# Patient Record
Sex: FEMALE | Race: WHITE | NOT HISPANIC OR LATINO | Employment: OTHER | URBAN - METROPOLITAN AREA
[De-identification: names, ages, dates, MRNs, and addresses within clinical notes are randomized per-mention and may not be internally consistent; named-entity substitution may affect disease eponyms.]

---

## 2018-07-02 DIAGNOSIS — I25.10 CORONARY ARTERY DISEASE, ANGINA PRESENCE UNSPECIFIED, UNSPECIFIED VESSEL OR LESION TYPE, UNSPECIFIED WHETHER NATIVE OR TRANSPLANTED HEART: Primary | ICD-10-CM

## 2018-07-03 ENCOUNTER — CLINICAL SUPPORT (OUTPATIENT)
Dept: CARDIOLOGY | Facility: CLINIC | Age: 68
End: 2018-07-03
Payer: MEDICARE

## 2018-07-03 ENCOUNTER — OFFICE VISIT (OUTPATIENT)
Dept: CARDIOLOGY | Facility: CLINIC | Age: 68
End: 2018-07-03
Payer: MEDICARE

## 2018-07-03 VITALS
HEART RATE: 67 BPM | HEIGHT: 60 IN | SYSTOLIC BLOOD PRESSURE: 134 MMHG | DIASTOLIC BLOOD PRESSURE: 82 MMHG | WEIGHT: 158.31 LBS | BODY MASS INDEX: 31.08 KG/M2

## 2018-07-03 DIAGNOSIS — I25.10 CORONARY ARTERY DISEASE, ANGINA PRESENCE UNSPECIFIED, UNSPECIFIED VESSEL OR LESION TYPE, UNSPECIFIED WHETHER NATIVE OR TRANSPLANTED HEART: ICD-10-CM

## 2018-07-03 DIAGNOSIS — E78.5 HYPERLIPIDEMIA, UNSPECIFIED HYPERLIPIDEMIA TYPE: ICD-10-CM

## 2018-07-03 DIAGNOSIS — I25.2 OLD MI (MYOCARDIAL INFARCTION): ICD-10-CM

## 2018-07-03 DIAGNOSIS — Z98.61 S/P PTCA (PERCUTANEOUS TRANSLUMINAL CORONARY ANGIOPLASTY): ICD-10-CM

## 2018-07-03 DIAGNOSIS — I25.10 CORONARY ARTERY DISEASE, ANGINA PRESENCE UNSPECIFIED, UNSPECIFIED VESSEL OR LESION TYPE, UNSPECIFIED WHETHER NATIVE OR TRANSPLANTED HEART: Primary | ICD-10-CM

## 2018-07-03 DIAGNOSIS — I10 HYPERTENSION, UNSPECIFIED TYPE: ICD-10-CM

## 2018-07-03 PROCEDURE — 3075F SYST BP GE 130 - 139MM HG: CPT | Mod: CPTII,S$GLB,, | Performed by: INTERNAL MEDICINE

## 2018-07-03 PROCEDURE — 99214 OFFICE O/P EST MOD 30 MIN: CPT | Mod: S$GLB,,, | Performed by: INTERNAL MEDICINE

## 2018-07-03 PROCEDURE — 99999 PR PBB SHADOW E&M-EST. PATIENT-LVL III: CPT | Mod: PBBFAC,,, | Performed by: INTERNAL MEDICINE

## 2018-07-03 PROCEDURE — 3079F DIAST BP 80-89 MM HG: CPT | Mod: CPTII,S$GLB,, | Performed by: INTERNAL MEDICINE

## 2018-07-03 PROCEDURE — 93000 ELECTROCARDIOGRAM COMPLETE: CPT | Mod: S$GLB,,, | Performed by: INTERNAL MEDICINE

## 2018-07-03 RX ORDER — PANTOPRAZOLE SODIUM 40 MG/1
40 TABLET, DELAYED RELEASE ORAL 2 TIMES DAILY
COMMUNITY

## 2018-07-03 NOTE — PROGRESS NOTES
Subjective:   Patient ID:  Dottie Ramos is a 67 y.o. female who presents for evaluation of Follow-up      HPI  A 66 yo female with h/o mi complex coronary intervention htn hlp is here for evaluation she has not been in clinic since 2/2015 more than 3 years ago. She has  Had foot surgery twice on the left and once on the rt she is not exercising she has dental issues her eating habits are not on target. She has no chest pain or shortness of breath. She has no issues clinically with chf palpitation tia claudication. She is not smoking.take meds regularily. Has no bleeding . Her lbas were done at her pcp her  ekg is normal.  Past Medical History:   Diagnosis Date    Coronary artery disease     Hyperlipidemia     Hypertension     Old MI (myocardial infarction)     S/P PTCA (percutaneous transluminal coronary angioplasty)        Past Surgical History:   Procedure Laterality Date    CORONARY ANGIOPLASTY         Social History   Substance Use Topics    Smoking status: Former Smoker     Packs/day: 1.00     Years: 30.00     Quit date: 1/8/2004    Smokeless tobacco: Never Used    Alcohol use No       Family History   Problem Relation Age of Onset    Heart attack Father     Heart attack Brother     No Known Problems Mother     No Known Problems Sister     No Known Problems Maternal Aunt     No Known Problems Maternal Uncle     No Known Problems Paternal Aunt     No Known Problems Paternal Uncle     No Known Problems Maternal Grandmother     No Known Problems Maternal Grandfather     No Known Problems Paternal Grandmother     No Known Problems Paternal Grandfather     Asthma Neg Hx     Cancer Neg Hx     Diabetes Neg Hx     Emphysema Neg Hx     Heart failure Neg Hx     Hypertension Neg Hx     Asbestos Neg Hx        Current Outpatient Prescriptions   Medication Sig    aspirin (ECOTRIN) 81 MG EC tablet Take 81 mg by mouth once daily.    citalopram (CELEXA) 20 MG tablet Take 20 mg by mouth once  daily.    clopidogrel (PLAVIX) 75 mg tablet Take 75 mg by mouth once daily.    losartan (COZAAR) 50 MG tablet Take 50 mg by mouth once daily.    metoprolol tartrate (LOPRESSOR) 25 MG tablet Take 25 mg by mouth 2 (two) times daily.    pantoprazole (PROTONIX) 40 MG tablet Take 40 mg by mouth 2 (two) times daily.    simvastatin (ZOCOR) 80 MG tablet Take 80 mg by mouth every evening.    zolpidem (AMBIEN) 5 MG Tab Take 5 mg by mouth nightly as needed.    nitroGLYCERIN (NITROSTAT) 0.4 MG SL tablet Place 1 tablet (0.4 mg total) under the tongue every 5 (five) minutes as needed for Chest pain.     No current facility-administered medications for this visit.      Current Outpatient Prescriptions on File Prior to Visit   Medication Sig    aspirin (ECOTRIN) 81 MG EC tablet Take 81 mg by mouth once daily.    citalopram (CELEXA) 20 MG tablet Take 20 mg by mouth once daily.    clopidogrel (PLAVIX) 75 mg tablet Take 75 mg by mouth once daily.    losartan (COZAAR) 50 MG tablet Take 50 mg by mouth once daily.    metoprolol tartrate (LOPRESSOR) 25 MG tablet Take 25 mg by mouth 2 (two) times daily.    simvastatin (ZOCOR) 80 MG tablet Take 80 mg by mouth every evening.    zolpidem (AMBIEN) 5 MG Tab Take 5 mg by mouth nightly as needed.    nitroGLYCERIN (NITROSTAT) 0.4 MG SL tablet Place 1 tablet (0.4 mg total) under the tongue every 5 (five) minutes as needed for Chest pain.    [DISCONTINUED] dicyclomine (BENTYL) 10 MG capsule Take 10 mg by mouth once daily.     No current facility-administered medications on file prior to visit.        Review of patient's allergies indicates:   Allergen Reactions    Macrodantin [nitrofurantoin macrocrystalline] Swelling     Pt reports occurrence of tongue swelling    Bactrim [sulfamethoxazole-trimethoprim] Rash    Pcn [penicillins] Rash     Review of patient's allergies indicates:   Allergen Reactions    Macrodantin [nitrofurantoin macrocrystalline] Swelling     Pt reports  occurrence of tongue swelling    Bactrim [sulfamethoxazole-trimethoprim] Rash    Pcn [penicillins] Rash       Review of Systems   Constitution: Negative for diaphoresis, weakness, malaise/fatigue and weight gain.   HENT: Negative for hoarse voice.    Eyes: Negative for double vision and visual disturbance.   Cardiovascular: Positive for leg swelling. Negative for chest pain, claudication, cyanosis, dyspnea on exertion, irregular heartbeat, near-syncope, orthopnea, palpitations, paroxysmal nocturnal dyspnea and syncope.   Respiratory: Negative for cough, hemoptysis, shortness of breath and snoring.    Hematologic/Lymphatic: Negative for bleeding problem. Does not bruise/bleed easily.   Skin: Negative for color change and poor wound healing.   Musculoskeletal: Positive for arthritis and joint pain. Negative for muscle cramps, muscle weakness and myalgias.   Gastrointestinal: Positive for heartburn. Negative for bloating, abdominal pain, change in bowel habit, diarrhea, hematemesis, hematochezia, melena and nausea.   Neurological: Negative for excessive daytime sleepiness, dizziness, headaches, light-headedness, loss of balance and numbness.   Psychiatric/Behavioral: Negative for memory loss. The patient does not have insomnia.    Allergic/Immunologic: Negative for hives.       Objective:   Physical Exam   Constitutional: She is oriented to person, place, and time. She appears well-developed and well-nourished. She does not appear ill. No distress.   HENT:   Head: Normocephalic and atraumatic.   Eyes: EOM are normal. Pupils are equal, round, and reactive to light. No scleral icterus.   Neck: Normal range of motion. Neck supple. Normal carotid pulses, no hepatojugular reflux and no JVD present. Carotid bruit is not present. No tracheal deviation present. No thyromegaly present.   Cardiovascular: Normal rate, regular rhythm, normal heart sounds and normal pulses.  Exam reveals no gallop and no friction rub.    No  murmur heard.  Pulmonary/Chest: Effort normal and breath sounds normal. No respiratory distress. She has no wheezes. She has no rhonchi. She has no rales. She exhibits no tenderness.   Abdominal: Soft. Normal appearance, normal aorta and bowel sounds are normal. She exhibits no abdominal bruit, no ascites and no pulsatile midline mass. There is no hepatomegaly. There is no tenderness.   Musculoskeletal: She exhibits no edema.        Right shoulder: She exhibits no deformity.   Neurological: She is alert and oriented to person, place, and time. She has normal strength. No cranial nerve deficit. Coordination normal.   Skin: Skin is warm and dry. No rash noted. She is not diaphoretic. No cyanosis or erythema. Nails show no clubbing.   Psychiatric: She has a normal mood and affect. Her speech is normal and behavior is normal.   Nursing note and vitals reviewed.    Vitals:    07/03/18 0905   BP: 134/82   BP Location: Right arm   Patient Position: Sitting   Pulse: 67   Weight: 71.8 kg (158 lb 4.6 oz)   Height: 5' (1.524 m)     Lab Results   Component Value Date    CHOL 172 03/02/2015    CHOL 170 01/08/2014    CHOL 148 07/08/2011     Lab Results   Component Value Date    HDL 51 03/02/2015    HDL 49 01/08/2014    HDL 44 07/08/2011     Lab Results   Component Value Date    LDLCALC 103.0 03/02/2015    LDLCALC 97.4 01/08/2014    LDLCALC 85.8 07/08/2011     Lab Results   Component Value Date    TRIG 90 03/02/2015    TRIG 118 01/08/2014    TRIG 91 07/08/2011     Lab Results   Component Value Date    CHOLHDL 29.7 03/02/2015    CHOLHDL 28.8 01/08/2014    CHOLHDL 29.7 07/08/2011       Chemistry        Component Value Date/Time     03/02/2015 0926    K 5.0 03/02/2015 0926     03/02/2015 0926    CO2 28 03/02/2015 0926    BUN 17 03/02/2015 0926    CREATININE 0.8 01/29/2016 1229    GLU 98 03/02/2015 0926        Component Value Date/Time    CALCIUM 10.2 03/02/2015 0926    ALKPHOS 58 03/02/2015 0926    AST 27 03/02/2015 0926     ALT 29 03/02/2015 0926    BILITOT 0.5 03/02/2015 0926    ESTGFRAFRICA >60.0 01/29/2016 1229    EGFRNONAA >60.0 01/29/2016 1229          Lab Results   Component Value Date    TSH 0.944 03/02/2015     Lab Results   Component Value Date    INR 1.0 01/12/2005     Lab Results   Component Value Date    WBC 4.89 03/02/2015    HGB 14.3 03/02/2015    HCT 41.7 03/02/2015    MCV 92 03/02/2015     03/02/2015     BNP  @LABRCNTIP(BNP,BNPTRIAGEBLO)@  CrCl cannot be calculated (Patient's most recent lab result is older than the maximum 7 days allowed.).  Assessment:     1. Coronary artery disease, angina presence unspecified, unspecified vessel or lesion type, unspecified whether native or transplanted heart    2. Hyperlipidemia, unspecified hyperlipidemia type    3. Hypertension, unspecified type    4. Old MI (myocardial infarction)    5. S/P PTCA (percutaneous transluminal coronary angioplasty)      Stable clinically asymptomatic ekg normal. She needs to be more active exercise and weight loss and be more compliant .will get lab work from her pcp office   Plan:   Continue current therapy  Cardiac low salt diet.  Risk factor modification and excercise program./weight loss   F/u in 6 months with lipid cmp ekg.

## 2019-01-07 ENCOUNTER — LAB VISIT (OUTPATIENT)
Dept: LAB | Facility: HOSPITAL | Age: 69
End: 2019-01-07
Attending: INTERNAL MEDICINE
Payer: MEDICARE

## 2019-01-07 DIAGNOSIS — E78.5 HYPERLIPIDEMIA, UNSPECIFIED HYPERLIPIDEMIA TYPE: ICD-10-CM

## 2019-01-07 DIAGNOSIS — I25.10 CORONARY ARTERY DISEASE, ANGINA PRESENCE UNSPECIFIED, UNSPECIFIED VESSEL OR LESION TYPE, UNSPECIFIED WHETHER NATIVE OR TRANSPLANTED HEART: ICD-10-CM

## 2019-01-07 LAB
CHOLEST SERPL-MCNC: 149 MG/DL
CHOLEST/HDLC SERPL: 3 {RATIO}
HDLC SERPL-MCNC: 49 MG/DL
HDLC SERPL: 32.9 %
LDLC SERPL CALC-MCNC: 78.8 MG/DL
NONHDLC SERPL-MCNC: 100 MG/DL
TRIGL SERPL-MCNC: 106 MG/DL

## 2019-01-07 PROCEDURE — 80061 LIPID PANEL: CPT

## 2019-01-07 PROCEDURE — 36415 COLL VENOUS BLD VENIPUNCTURE: CPT | Mod: PO

## 2019-01-15 DIAGNOSIS — I25.10 CORONARY ARTERY DISEASE, ANGINA PRESENCE UNSPECIFIED, UNSPECIFIED VESSEL OR LESION TYPE, UNSPECIFIED WHETHER NATIVE OR TRANSPLANTED HEART: Primary | ICD-10-CM

## 2019-01-16 ENCOUNTER — OFFICE VISIT (OUTPATIENT)
Dept: CARDIOLOGY | Facility: CLINIC | Age: 69
End: 2019-01-16
Payer: MEDICARE

## 2019-01-16 ENCOUNTER — CLINICAL SUPPORT (OUTPATIENT)
Dept: CARDIOLOGY | Facility: CLINIC | Age: 69
End: 2019-01-16
Payer: MEDICARE

## 2019-01-16 VITALS
BODY MASS INDEX: 31.41 KG/M2 | WEIGHT: 160 LBS | SYSTOLIC BLOOD PRESSURE: 138 MMHG | DIASTOLIC BLOOD PRESSURE: 76 MMHG | HEART RATE: 61 BPM | HEIGHT: 60 IN

## 2019-01-16 DIAGNOSIS — E78.5 HYPERLIPIDEMIA, UNSPECIFIED HYPERLIPIDEMIA TYPE: ICD-10-CM

## 2019-01-16 DIAGNOSIS — I25.10 CORONARY ARTERY DISEASE, ANGINA PRESENCE UNSPECIFIED, UNSPECIFIED VESSEL OR LESION TYPE, UNSPECIFIED WHETHER NATIVE OR TRANSPLANTED HEART: ICD-10-CM

## 2019-01-16 DIAGNOSIS — I25.10 CORONARY ARTERY DISEASE, ANGINA PRESENCE UNSPECIFIED, UNSPECIFIED VESSEL OR LESION TYPE, UNSPECIFIED WHETHER NATIVE OR TRANSPLANTED HEART: Primary | ICD-10-CM

## 2019-01-16 DIAGNOSIS — I25.10 CORONARY ARTERY DISEASE: ICD-10-CM

## 2019-01-16 DIAGNOSIS — Z98.61 S/P PTCA (PERCUTANEOUS TRANSLUMINAL CORONARY ANGIOPLASTY): ICD-10-CM

## 2019-01-16 DIAGNOSIS — I10 HYPERTENSION, UNSPECIFIED TYPE: ICD-10-CM

## 2019-01-16 DIAGNOSIS — I25.2 OLD MI (MYOCARDIAL INFARCTION): ICD-10-CM

## 2019-01-16 PROCEDURE — 3075F SYST BP GE 130 - 139MM HG: CPT | Mod: CPTII,S$GLB,, | Performed by: INTERNAL MEDICINE

## 2019-01-16 PROCEDURE — 99999 PR PBB SHADOW E&M-EST. PATIENT-LVL III: CPT | Mod: PBBFAC,,, | Performed by: INTERNAL MEDICINE

## 2019-01-16 PROCEDURE — 99214 OFFICE O/P EST MOD 30 MIN: CPT | Mod: S$GLB,,, | Performed by: INTERNAL MEDICINE

## 2019-01-16 PROCEDURE — 3078F DIAST BP <80 MM HG: CPT | Mod: CPTII,S$GLB,, | Performed by: INTERNAL MEDICINE

## 2019-01-16 PROCEDURE — 93000 ELECTROCARDIOGRAM COMPLETE: CPT | Mod: S$GLB,,, | Performed by: INTERNAL MEDICINE

## 2019-01-16 PROCEDURE — 3078F PR MOST RECENT DIASTOLIC BLOOD PRESSURE < 80 MM HG: ICD-10-PCS | Mod: CPTII,S$GLB,, | Performed by: INTERNAL MEDICINE

## 2019-01-16 PROCEDURE — 99999 PR PBB SHADOW E&M-EST. PATIENT-LVL III: ICD-10-PCS | Mod: PBBFAC,,, | Performed by: INTERNAL MEDICINE

## 2019-01-16 PROCEDURE — 3075F PR MOST RECENT SYSTOLIC BLOOD PRESS GE 130-139MM HG: ICD-10-PCS | Mod: CPTII,S$GLB,, | Performed by: INTERNAL MEDICINE

## 2019-01-16 PROCEDURE — 93000 EKG 12-LEAD: ICD-10-PCS | Mod: S$GLB,,, | Performed by: INTERNAL MEDICINE

## 2019-01-16 PROCEDURE — 99214 PR OFFICE/OUTPT VISIT, EST, LEVL IV, 30-39 MIN: ICD-10-PCS | Mod: S$GLB,,, | Performed by: INTERNAL MEDICINE

## 2019-01-16 RX ORDER — NITROGLYCERIN 0.4 MG/1
0.4 TABLET SUBLINGUAL EVERY 5 MIN PRN
Qty: 60 TABLET | Refills: 12 | Status: SHIPPED | OUTPATIENT
Start: 2019-01-16 | End: 2023-04-12

## 2019-01-16 RX ORDER — DICYCLOMINE HYDROCHLORIDE 10 MG/1
10 CAPSULE ORAL 3 TIMES DAILY
COMMUNITY

## 2019-01-16 NOTE — PROGRESS NOTES
Subjective:   Patient ID:  Dottie Ramos is a 68 y.o. female who presents for follow up of Coronary Artery Disease; Hyperlipidemia; and Hypertension      HPI  A 67 yo female with cad htn hlp obesity is here for f/u she ahs beeen doing well clinically had exercise bike not using much yet needs to do betetr has no syncope near syncope  Has no chest pain or shortness of breath had gallbladder surgery in October w/o complications.had dental issues has dentures do not fit well. She is not been able to be compliant with diet she uses soft food lots of carbohydrates.  Lipids despite that are  on target.ekg unchanged,  Past Medical History:   Diagnosis Date    Coronary artery disease     Hyperlipidemia     Hypertension     Old MI (myocardial infarction)     S/P PTCA (percutaneous transluminal coronary angioplasty)        Past Surgical History:   Procedure Laterality Date    CORONARY ANGIOPLASTY         Social History     Tobacco Use    Smoking status: Former Smoker     Packs/day: 1.00     Years: 30.00     Pack years: 30.00     Last attempt to quit: 1/8/2004     Years since quitting: 15.0    Smokeless tobacco: Never Used   Substance Use Topics    Alcohol use: No    Drug use: No       Family History   Problem Relation Age of Onset    Heart attack Father     Heart attack Brother     No Known Problems Mother     No Known Problems Sister     No Known Problems Maternal Aunt     No Known Problems Maternal Uncle     No Known Problems Paternal Aunt     No Known Problems Paternal Uncle     No Known Problems Maternal Grandmother     No Known Problems Maternal Grandfather     No Known Problems Paternal Grandmother     No Known Problems Paternal Grandfather     Asthma Neg Hx     Cancer Neg Hx     Diabetes Neg Hx     Emphysema Neg Hx     Heart failure Neg Hx     Hypertension Neg Hx     Asbestos Neg Hx        Current Outpatient Medications   Medication Sig    aspirin (ECOTRIN) 81 MG EC tablet Take 81 mg by  mouth once daily.    citalopram (CELEXA) 20 MG tablet Take 20 mg by mouth once daily.    clopidogrel (PLAVIX) 75 mg tablet Take 75 mg by mouth once daily.    dicyclomine (BENTYL) 10 MG capsule Take 10 mg by mouth 3 (three) times daily.    losartan (COZAAR) 50 MG tablet Take 50 mg by mouth once daily.    metoprolol tartrate (LOPRESSOR) 25 MG tablet Take 25 mg by mouth 2 (two) times daily.    pantoprazole (PROTONIX) 40 MG tablet Take 40 mg by mouth 2 (two) times daily.    simvastatin (ZOCOR) 80 MG tablet Take 80 mg by mouth every evening.    zolpidem (AMBIEN) 5 MG Tab Take 5 mg by mouth nightly as needed.    nitroGLYCERIN (NITROSTAT) 0.4 MG SL tablet Place 1 tablet (0.4 mg total) under the tongue every 5 (five) minutes as needed for Chest pain.     No current facility-administered medications for this visit.      Current Outpatient Medications on File Prior to Visit   Medication Sig    aspirin (ECOTRIN) 81 MG EC tablet Take 81 mg by mouth once daily.    citalopram (CELEXA) 20 MG tablet Take 20 mg by mouth once daily.    clopidogrel (PLAVIX) 75 mg tablet Take 75 mg by mouth once daily.    dicyclomine (BENTYL) 10 MG capsule Take 10 mg by mouth 3 (three) times daily.    losartan (COZAAR) 50 MG tablet Take 50 mg by mouth once daily.    metoprolol tartrate (LOPRESSOR) 25 MG tablet Take 25 mg by mouth 2 (two) times daily.    pantoprazole (PROTONIX) 40 MG tablet Take 40 mg by mouth 2 (two) times daily.    simvastatin (ZOCOR) 80 MG tablet Take 80 mg by mouth every evening.    zolpidem (AMBIEN) 5 MG Tab Take 5 mg by mouth nightly as needed.    nitroGLYCERIN (NITROSTAT) 0.4 MG SL tablet Place 1 tablet (0.4 mg total) under the tongue every 5 (five) minutes as needed for Chest pain.     No current facility-administered medications on file prior to visit.      Review of patient's allergies indicates:   Allergen Reactions    Macrodantin [nitrofurantoin macrocrystalline] Swelling     Pt reports occurrence of  tongue swelling    Bactrim [sulfamethoxazole-trimethoprim] Rash    Pcn [penicillins] Rash       Review of Systems   Constitution: Positive for weight gain. Negative for diaphoresis, weakness, malaise/fatigue and weight loss.   HENT: Negative for hoarse voice.    Eyes: Negative for double vision and visual disturbance.   Cardiovascular: Negative for chest pain, claudication, cyanosis, dyspnea on exertion, irregular heartbeat, leg swelling, near-syncope, orthopnea, palpitations, paroxysmal nocturnal dyspnea and syncope.   Respiratory: Negative for cough, hemoptysis, shortness of breath and snoring.    Hematologic/Lymphatic: Negative for bleeding problem. Does not bruise/bleed easily.   Skin: Negative for color change and poor wound healing.   Musculoskeletal: Negative for muscle cramps, muscle weakness and myalgias.   Gastrointestinal: Negative for bloating, abdominal pain, change in bowel habit, diarrhea, heartburn, hematemesis, hematochezia, melena and nausea.   Neurological: Negative for excessive daytime sleepiness, dizziness, headaches, light-headedness, loss of balance and numbness.   Psychiatric/Behavioral: Negative for memory loss. The patient does not have insomnia.    Allergic/Immunologic: Negative for hives.       Objective:   Physical Exam   Constitutional: She is oriented to person, place, and time. She appears well-developed and well-nourished. She does not appear ill. No distress.   HENT:   Head: Normocephalic and atraumatic.   Eyes: EOM are normal. Pupils are equal, round, and reactive to light. No scleral icterus.   Neck: Normal range of motion. Neck supple. Normal carotid pulses, no hepatojugular reflux and no JVD present. Carotid bruit is not present. No tracheal deviation present. No thyromegaly present.   Cardiovascular: Normal rate, regular rhythm, normal heart sounds and normal pulses. Exam reveals no gallop and no friction rub.   No murmur heard.  Pulmonary/Chest: Effort normal and breath  sounds normal. No respiratory distress. She has no wheezes. She has no rhonchi. She has no rales. She exhibits no tenderness.   Abdominal: Soft. Normal appearance, normal aorta and bowel sounds are normal. She exhibits no abdominal bruit, no ascites and no pulsatile midline mass. There is no hepatomegaly. There is no tenderness.   Musculoskeletal: She exhibits no edema.        Right shoulder: She exhibits no deformity.   Neurological: She is alert and oriented to person, place, and time. She has normal strength. No cranial nerve deficit. Coordination normal.   Skin: Skin is warm and dry. No rash noted. She is not diaphoretic. No cyanosis or erythema. Nails show no clubbing.   Psychiatric: She has a normal mood and affect. Her speech is normal and behavior is normal.   Nursing note and vitals reviewed.    Vitals:    01/16/19 1012   BP: 138/76   BP Location: Right arm   Patient Position: Sitting   BP Method: Medium (Manual)   Pulse: 61   Weight: 72.6 kg (160 lb)   Height: 5' (1.524 m)     Lab Results   Component Value Date    CHOL 149 01/07/2019    CHOL 172 03/02/2015    CHOL 170 01/08/2014     Lab Results   Component Value Date    HDL 49 01/07/2019    HDL 51 03/02/2015    HDL 49 01/08/2014     Lab Results   Component Value Date    LDLCALC 78.8 01/07/2019    LDLCALC 103.0 03/02/2015    LDLCALC 97.4 01/08/2014     Lab Results   Component Value Date    TRIG 106 01/07/2019    TRIG 90 03/02/2015    TRIG 118 01/08/2014     Lab Results   Component Value Date    CHOLHDL 32.9 01/07/2019    CHOLHDL 29.7 03/02/2015    CHOLHDL 28.8 01/08/2014       Chemistry        Component Value Date/Time     03/02/2015 0926    K 5.0 03/02/2015 0926     03/02/2015 0926    CO2 28 03/02/2015 0926    BUN 17 03/02/2015 0926    CREATININE 0.8 01/29/2016 1229    GLU 98 03/02/2015 0926        Component Value Date/Time    CALCIUM 10.2 03/02/2015 0926    ALKPHOS 58 03/02/2015 0926    AST 27 03/02/2015 0926    ALT 29 03/02/2015 0926     BILITOT 0.5 03/02/2015 0926    ESTGFRAFRICA >60.0 01/29/2016 1229    EGFRNONAA >60.0 01/29/2016 1229          Lab Results   Component Value Date    TSH 0.944 03/02/2015     Lab Results   Component Value Date    INR 1.0 01/12/2005     Lab Results   Component Value Date    WBC 4.89 03/02/2015    HGB 14.3 03/02/2015    HCT 41.7 03/02/2015    MCV 92 03/02/2015     03/02/2015     BMP  Sodium   Date Value Ref Range Status   03/02/2015 142 136 - 145 mmol/L Final     Potassium   Date Value Ref Range Status   03/02/2015 5.0 3.5 - 5.1 mmol/L Final     Chloride   Date Value Ref Range Status   03/02/2015 106 95 - 110 mmol/L Final     CO2   Date Value Ref Range Status   03/02/2015 28 23 - 29 mmol/L Final     BUN, Bld   Date Value Ref Range Status   03/02/2015 17 8 - 23 mg/dL Final     Creatinine   Date Value Ref Range Status   01/29/2016 0.8 0.5 - 1.4 mg/dL Final     Calcium   Date Value Ref Range Status   03/02/2015 10.2 8.7 - 10.5 mg/dL Final     Anion Gap   Date Value Ref Range Status   03/02/2015 8 8 - 16 mmol/L Final     eGFR if    Date Value Ref Range Status   01/29/2016 >60.0 >60 mL/min/1.73 m^2 Final     eGFR if non    Date Value Ref Range Status   01/29/2016 >60.0 >60 mL/min/1.73 m^2 Final     Comment:     Calculation used to obtain the estimated glomerular filtration  rate (eGFR) is the CKD-EPI equation. Since race is unknown   in our information system, the eGFR values for   -American and Non--American patients are given   for each creatinine result.       CrCl cannot be calculated (Patient's most recent lab result is older than the maximum 7 days allowed.).    Assessment:     1. Coronary artery disease, angina presence unspecified, unspecified vessel or lesion type, unspecified whether native or transplanted heart    2. Hyperlipidemia, unspecified hyperlipidemia type    3. Hypertension, unspecified type    4. Old MI (myocardial infarction)    5. S/P PTCA  (percutaneous transluminal coronary angioplasty)      asymptomatic clinically needs better exercise weight loss and compliance she was counseled.   Plan:   Continue current therapy  Cardiac low salt diet.  Risk factor modification and excercise program.  F/u in 6 months with lipid cmp .

## 2019-07-31 ENCOUNTER — LAB VISIT (OUTPATIENT)
Dept: LAB | Facility: HOSPITAL | Age: 69
End: 2019-07-31
Attending: INTERNAL MEDICINE
Payer: MEDICARE

## 2019-07-31 DIAGNOSIS — E78.5 HYPERLIPIDEMIA, UNSPECIFIED HYPERLIPIDEMIA TYPE: ICD-10-CM

## 2019-07-31 DIAGNOSIS — I25.10 CORONARY ARTERY DISEASE, ANGINA PRESENCE UNSPECIFIED, UNSPECIFIED VESSEL OR LESION TYPE, UNSPECIFIED WHETHER NATIVE OR TRANSPLANTED HEART: ICD-10-CM

## 2019-07-31 LAB
ALBUMIN SERPL BCP-MCNC: 4 G/DL (ref 3.5–5.2)
ALP SERPL-CCNC: 60 U/L (ref 55–135)
ALT SERPL W/O P-5'-P-CCNC: 19 U/L (ref 10–44)
ANION GAP SERPL CALC-SCNC: 7 MMOL/L (ref 8–16)
AST SERPL-CCNC: 24 U/L (ref 10–40)
BILIRUB SERPL-MCNC: 0.6 MG/DL (ref 0.1–1)
BUN SERPL-MCNC: 19 MG/DL (ref 8–23)
CALCIUM SERPL-MCNC: 10.3 MG/DL (ref 8.7–10.5)
CHLORIDE SERPL-SCNC: 102 MMOL/L (ref 95–110)
CHOLEST SERPL-MCNC: 152 MG/DL (ref 120–199)
CHOLEST/HDLC SERPL: 2.6 {RATIO} (ref 2–5)
CO2 SERPL-SCNC: 30 MMOL/L (ref 23–29)
CREAT SERPL-MCNC: 0.8 MG/DL (ref 0.5–1.4)
EST. GFR  (AFRICAN AMERICAN): >60 ML/MIN/1.73 M^2
EST. GFR  (NON AFRICAN AMERICAN): >60 ML/MIN/1.73 M^2
GLUCOSE SERPL-MCNC: 92 MG/DL (ref 70–110)
HDLC SERPL-MCNC: 58 MG/DL (ref 40–75)
HDLC SERPL: 38.2 % (ref 20–50)
LDLC SERPL CALC-MCNC: 79.2 MG/DL (ref 63–159)
NONHDLC SERPL-MCNC: 94 MG/DL
POTASSIUM SERPL-SCNC: 3.9 MMOL/L (ref 3.5–5.1)
PROT SERPL-MCNC: 6.6 G/DL (ref 6–8.4)
SODIUM SERPL-SCNC: 139 MMOL/L (ref 136–145)
TRIGL SERPL-MCNC: 74 MG/DL (ref 30–150)

## 2019-07-31 PROCEDURE — 80053 COMPREHEN METABOLIC PANEL: CPT

## 2019-07-31 PROCEDURE — 80061 LIPID PANEL: CPT

## 2019-07-31 PROCEDURE — 36415 COLL VENOUS BLD VENIPUNCTURE: CPT

## 2019-08-14 ENCOUNTER — OFFICE VISIT (OUTPATIENT)
Dept: CARDIOLOGY | Facility: CLINIC | Age: 69
End: 2019-08-14
Payer: MEDICARE

## 2019-08-14 VITALS
SYSTOLIC BLOOD PRESSURE: 146 MMHG | WEIGHT: 157.88 LBS | HEART RATE: 58 BPM | BODY MASS INDEX: 30.99 KG/M2 | HEIGHT: 60 IN | DIASTOLIC BLOOD PRESSURE: 93 MMHG

## 2019-08-14 DIAGNOSIS — I10 HYPERTENSION, UNSPECIFIED TYPE: ICD-10-CM

## 2019-08-14 DIAGNOSIS — I25.10 ATHEROSCLEROSIS OF NATIVE CORONARY ARTERY OF NATIVE HEART WITHOUT ANGINA PECTORIS: ICD-10-CM

## 2019-08-14 DIAGNOSIS — I25.10 CORONARY ARTERY DISEASE, ANGINA PRESENCE UNSPECIFIED, UNSPECIFIED VESSEL OR LESION TYPE, UNSPECIFIED WHETHER NATIVE OR TRANSPLANTED HEART: Primary | ICD-10-CM

## 2019-08-14 DIAGNOSIS — I25.2 OLD MI (MYOCARDIAL INFARCTION): ICD-10-CM

## 2019-08-14 DIAGNOSIS — Z98.61 S/P PTCA (PERCUTANEOUS TRANSLUMINAL CORONARY ANGIOPLASTY): ICD-10-CM

## 2019-08-14 DIAGNOSIS — E78.5 HYPERLIPIDEMIA, UNSPECIFIED HYPERLIPIDEMIA TYPE: ICD-10-CM

## 2019-08-14 PROCEDURE — 3074F PR MOST RECENT SYSTOLIC BLOOD PRESSURE < 130 MM HG: ICD-10-PCS | Mod: CPTII,S$GLB,, | Performed by: INTERNAL MEDICINE

## 2019-08-14 PROCEDURE — 3078F DIAST BP <80 MM HG: CPT | Mod: CPTII,S$GLB,, | Performed by: INTERNAL MEDICINE

## 2019-08-14 PROCEDURE — 3078F PR MOST RECENT DIASTOLIC BLOOD PRESSURE < 80 MM HG: ICD-10-PCS | Mod: CPTII,S$GLB,, | Performed by: INTERNAL MEDICINE

## 2019-08-14 PROCEDURE — 3074F SYST BP LT 130 MM HG: CPT | Mod: CPTII,S$GLB,, | Performed by: INTERNAL MEDICINE

## 2019-08-14 PROCEDURE — 99214 PR OFFICE/OUTPT VISIT, EST, LEVL IV, 30-39 MIN: ICD-10-PCS | Mod: S$GLB,,, | Performed by: INTERNAL MEDICINE

## 2019-08-14 PROCEDURE — 99999 PR PBB SHADOW E&M-EST. PATIENT-LVL III: ICD-10-PCS | Mod: PBBFAC,,, | Performed by: INTERNAL MEDICINE

## 2019-08-14 PROCEDURE — 99999 PR PBB SHADOW E&M-EST. PATIENT-LVL III: CPT | Mod: PBBFAC,,, | Performed by: INTERNAL MEDICINE

## 2019-08-14 PROCEDURE — 99214 OFFICE O/P EST MOD 30 MIN: CPT | Mod: S$GLB,,, | Performed by: INTERNAL MEDICINE

## 2019-08-14 RX ORDER — CHOLECALCIFEROL (VITAMIN D3) 25 MCG
2000 TABLET ORAL DAILY
COMMUNITY

## 2019-08-14 NOTE — PROGRESS NOTES
Subjective:   Patient ID:  Dottie Ramos is a 68 y.o. female who presents for follow up of Coronary Artery Disease; Hypertension; and Hyperlipidemia      HPI  A 67 yo female with cad s/p bifurcation lad diagonal stent htn hlp old mi is here for f /u cad./ she ahs been exercising but not being compliant with diet. Has no chest pain or shortness of breath. Has no other issues clinically. Lipids are marginal. Weight seems to fluctuates.  Past Medical History:   Diagnosis Date    Coronary artery disease     Hyperlipidemia     Hypertension     Old MI (myocardial infarction)     S/P PTCA (percutaneous transluminal coronary angioplasty)        Past Surgical History:   Procedure Laterality Date    CORONARY ANGIOPLASTY         Social History     Tobacco Use    Smoking status: Former Smoker     Packs/day: 1.00     Years: 30.00     Pack years: 30.00     Last attempt to quit: 1/8/2004     Years since quitting: 15.6    Smokeless tobacco: Never Used   Substance Use Topics    Alcohol use: No    Drug use: No       Family History   Problem Relation Age of Onset    Heart attack Father     Heart attack Brother     No Known Problems Mother     No Known Problems Sister     No Known Problems Maternal Aunt     No Known Problems Maternal Uncle     No Known Problems Paternal Aunt     No Known Problems Paternal Uncle     No Known Problems Maternal Grandmother     No Known Problems Maternal Grandfather     No Known Problems Paternal Grandmother     No Known Problems Paternal Grandfather     Asthma Neg Hx     Cancer Neg Hx     Diabetes Neg Hx     Emphysema Neg Hx     Heart failure Neg Hx     Hypertension Neg Hx     Asbestos Neg Hx        Current Outpatient Medications   Medication Sig    aspirin (ECOTRIN) 81 MG EC tablet Take 81 mg by mouth once daily.    citalopram (CELEXA) 20 MG tablet Take 20 mg by mouth once daily.    clopidogrel (PLAVIX) 75 mg tablet Take 75 mg by mouth once daily.    dicyclomine (BENTYL)  10 MG capsule Take 10 mg by mouth 3 (three) times daily.    losartan (COZAAR) 50 MG tablet Take 50 mg by mouth once daily.    metoprolol tartrate (LOPRESSOR) 25 MG tablet Take 25 mg by mouth 2 (two) times daily.    pantoprazole (PROTONIX) 40 MG tablet Take 40 mg by mouth 2 (two) times daily.    simvastatin (ZOCOR) 80 MG tablet Take 80 mg by mouth every evening.    vitamin D (VITAMIN D3) 1000 units Tab Take 2,000 Units by mouth once daily.    zolpidem (AMBIEN) 5 MG Tab Take 5 mg by mouth nightly as needed.    nitroGLYCERIN (NITROSTAT) 0.4 MG SL tablet Place 1 tablet (0.4 mg total) under the tongue every 5 (five) minutes as needed for Chest pain.     No current facility-administered medications for this visit.      Current Outpatient Medications on File Prior to Visit   Medication Sig    aspirin (ECOTRIN) 81 MG EC tablet Take 81 mg by mouth once daily.    citalopram (CELEXA) 20 MG tablet Take 20 mg by mouth once daily.    clopidogrel (PLAVIX) 75 mg tablet Take 75 mg by mouth once daily.    dicyclomine (BENTYL) 10 MG capsule Take 10 mg by mouth 3 (three) times daily.    losartan (COZAAR) 50 MG tablet Take 50 mg by mouth once daily.    metoprolol tartrate (LOPRESSOR) 25 MG tablet Take 25 mg by mouth 2 (two) times daily.    pantoprazole (PROTONIX) 40 MG tablet Take 40 mg by mouth 2 (two) times daily.    simvastatin (ZOCOR) 80 MG tablet Take 80 mg by mouth every evening.    vitamin D (VITAMIN D3) 1000 units Tab Take 2,000 Units by mouth once daily.    zolpidem (AMBIEN) 5 MG Tab Take 5 mg by mouth nightly as needed.    nitroGLYCERIN (NITROSTAT) 0.4 MG SL tablet Place 1 tablet (0.4 mg total) under the tongue every 5 (five) minutes as needed for Chest pain.     No current facility-administered medications on file prior to visit.      Review of patient's allergies indicates:   Allergen Reactions    Macrodantin [nitrofurantoin macrocrystalline] Swelling     Pt reports occurrence of tongue swelling     Bactrim [sulfamethoxazole-trimethoprim] Rash    Pcn [penicillins] Rash     Review of Systems   Constitution: Negative for diaphoresis, malaise/fatigue and weight gain.   HENT: Negative for hoarse voice.    Eyes: Negative for double vision and visual disturbance.   Cardiovascular: Negative for chest pain, claudication, cyanosis, dyspnea on exertion, irregular heartbeat, leg swelling, near-syncope, orthopnea, palpitations, paroxysmal nocturnal dyspnea and syncope.   Respiratory: Negative for cough, hemoptysis, shortness of breath and snoring.    Hematologic/Lymphatic: Negative for bleeding problem. Does not bruise/bleed easily.   Skin: Negative for color change and poor wound healing.   Musculoskeletal: Negative for muscle cramps, muscle weakness and myalgias.   Gastrointestinal: Negative for bloating, abdominal pain, change in bowel habit, diarrhea, heartburn, hematemesis, hematochezia, melena and nausea.   Neurological: Negative for excessive daytime sleepiness, dizziness, headaches, light-headedness, loss of balance, numbness and weakness.   Psychiatric/Behavioral: Negative for memory loss. The patient does not have insomnia.    Allergic/Immunologic: Negative for hives.       Objective:   Physical Exam   Constitutional: She is oriented to person, place, and time. She appears well-developed and well-nourished. She does not appear ill. No distress.   HENT:   Head: Normocephalic and atraumatic.   Eyes: Pupils are equal, round, and reactive to light. EOM are normal. No scleral icterus.   Neck: Normal range of motion. Neck supple. Normal carotid pulses, no hepatojugular reflux and no JVD present. Carotid bruit is not present. No tracheal deviation present. No thyromegaly present.   Cardiovascular: Normal rate, regular rhythm, normal heart sounds, intact distal pulses and normal pulses. Exam reveals no gallop and no friction rub.   No murmur heard.  Pulses:       Carotid pulses are 2+ on the right side, and 2+ on the  left side.       Radial pulses are 2+ on the right side, and 2+ on the left side.        Femoral pulses are 2+ on the right side, and 2+ on the left side.       Popliteal pulses are 2+ on the right side, and 2+ on the left side.        Dorsalis pedis pulses are 2+ on the right side, and 2+ on the left side.        Posterior tibial pulses are 2+ on the right side, and 2+ on the left side.   Pulmonary/Chest: Effort normal and breath sounds normal. No respiratory distress. She has no wheezes. She has no rhonchi. She has no rales. She exhibits no tenderness.   Abdominal: Soft. Normal appearance, normal aorta and bowel sounds are normal. She exhibits no abdominal bruit, no ascites and no pulsatile midline mass. There is no hepatomegaly. There is no tenderness.   Musculoskeletal: She exhibits no edema.        Right shoulder: She exhibits no deformity.   Neurological: She is alert and oriented to person, place, and time. She has normal strength. No cranial nerve deficit. Coordination normal.   Skin: Skin is warm and dry. No rash noted. She is not diaphoretic. No cyanosis or erythema. Nails show no clubbing.   Psychiatric: She has a normal mood and affect. Her speech is normal and behavior is normal.   Nursing note and vitals reviewed.    Vitals:    08/14/19 1115 08/14/19 1116   BP: 137/88 (!) 146/93   BP Location: Right arm Left arm   Patient Position: Sitting Sitting   BP Method: Medium (Automatic) Medium (Automatic)   Pulse: (!) 58    Weight: 71.6 kg (157 lb 13.6 oz)    Height: 5' (1.524 m)      Lab Results   Component Value Date    CHOL 152 07/31/2019    CHOL 149 01/07/2019    CHOL 172 03/02/2015     Lab Results   Component Value Date    HDL 58 07/31/2019    HDL 49 01/07/2019    HDL 51 03/02/2015     Lab Results   Component Value Date    LDLCALC 79.2 07/31/2019    LDLCALC 78.8 01/07/2019    LDLCALC 103.0 03/02/2015     Lab Results   Component Value Date    TRIG 74 07/31/2019    TRIG 106 01/07/2019    TRIG 90 03/02/2015      Lab Results   Component Value Date    CHOLHDL 38.2 07/31/2019    CHOLHDL 32.9 01/07/2019    CHOLHDL 29.7 03/02/2015       Chemistry        Component Value Date/Time     07/31/2019 0738    K 3.9 07/31/2019 0738     07/31/2019 0738    CO2 30 (H) 07/31/2019 0738    BUN 19 07/31/2019 0738    CREATININE 0.8 07/31/2019 0738    GLU 92 07/31/2019 0738        Component Value Date/Time    CALCIUM 10.3 07/31/2019 0738    ALKPHOS 60 07/31/2019 0738    AST 24 07/31/2019 0738    ALT 19 07/31/2019 0738    BILITOT 0.6 07/31/2019 0738    ESTGFRAFRICA >60.0 07/31/2019 0738    EGFRNONAA >60.0 07/31/2019 0738          Lab Results   Component Value Date    TSH 0.944 03/02/2015     Lab Results   Component Value Date    INR 1.0 01/12/2005     Lab Results   Component Value Date    WBC 4.89 03/02/2015    HGB 14.3 03/02/2015    HCT 41.7 03/02/2015    MCV 92 03/02/2015     03/02/2015     BMP  Sodium   Date Value Ref Range Status   07/31/2019 139 136 - 145 mmol/L Final     Potassium   Date Value Ref Range Status   07/31/2019 3.9 3.5 - 5.1 mmol/L Final     Chloride   Date Value Ref Range Status   07/31/2019 102 95 - 110 mmol/L Final     CO2   Date Value Ref Range Status   07/31/2019 30 (H) 23 - 29 mmol/L Final     BUN, Bld   Date Value Ref Range Status   07/31/2019 19 8 - 23 mg/dL Final     Creatinine   Date Value Ref Range Status   07/31/2019 0.8 0.5 - 1.4 mg/dL Final     Calcium   Date Value Ref Range Status   07/31/2019 10.3 8.7 - 10.5 mg/dL Final     Anion Gap   Date Value Ref Range Status   07/31/2019 7 (L) 8 - 16 mmol/L Final     eGFR if    Date Value Ref Range Status   07/31/2019 >60.0 >60 mL/min/1.73 m^2 Final     eGFR if non    Date Value Ref Range Status   07/31/2019 >60.0 >60 mL/min/1.73 m^2 Final     Comment:     Calculation used to obtain the estimated glomerular filtration  rate (eGFR) is the CKD-EPI equation.        CrCl cannot be calculated (Patient's most recent lab result  is older than the maximum 7 days allowed.).    Assessment:     1. Coronary artery disease, angina presence unspecified, unspecified vessel or lesion type, unspecified whether native or transplanted heart    2. Hyperlipidemia, unspecified hyperlipidemia type    3. Hypertension, unspecified type    4. Old MI (myocardial infarction)    5. S/P PTCA (percutaneous transluminal coronary angioplasty)    6. Atherosclerosis of native coronary artery of native heart without angina pectoris      Stable clinically needs better compliance. counseled about weight loss diet exercise.   Plan:   Continue current therapy  Cardiac low salt diet.  Risk factor modification and excercise program.  F/u in 6 months with lipid cmp

## 2020-02-07 ENCOUNTER — LAB VISIT (OUTPATIENT)
Dept: LAB | Facility: HOSPITAL | Age: 70
End: 2020-02-07
Attending: INTERNAL MEDICINE
Payer: MEDICARE

## 2020-02-07 DIAGNOSIS — I25.10 CORONARY ARTERY DISEASE, ANGINA PRESENCE UNSPECIFIED, UNSPECIFIED VESSEL OR LESION TYPE, UNSPECIFIED WHETHER NATIVE OR TRANSPLANTED HEART: ICD-10-CM

## 2020-02-07 DIAGNOSIS — E78.5 HYPERLIPIDEMIA, UNSPECIFIED HYPERLIPIDEMIA TYPE: ICD-10-CM

## 2020-02-07 LAB
ALBUMIN SERPL BCP-MCNC: 3.8 G/DL (ref 3.5–5.2)
ALP SERPL-CCNC: 56 U/L (ref 55–135)
ALT SERPL W/O P-5'-P-CCNC: 24 U/L (ref 10–44)
ANION GAP SERPL CALC-SCNC: 7 MMOL/L (ref 8–16)
AST SERPL-CCNC: 30 U/L (ref 10–40)
BILIRUB SERPL-MCNC: 0.7 MG/DL (ref 0.1–1)
BUN SERPL-MCNC: 14 MG/DL (ref 8–23)
CALCIUM SERPL-MCNC: 9.9 MG/DL (ref 8.7–10.5)
CHLORIDE SERPL-SCNC: 104 MMOL/L (ref 95–110)
CHOLEST SERPL-MCNC: 153 MG/DL (ref 120–199)
CHOLEST/HDLC SERPL: 2.7 {RATIO} (ref 2–5)
CO2 SERPL-SCNC: 29 MMOL/L (ref 23–29)
CREAT SERPL-MCNC: 0.8 MG/DL (ref 0.5–1.4)
EST. GFR  (AFRICAN AMERICAN): >60 ML/MIN/1.73 M^2
EST. GFR  (NON AFRICAN AMERICAN): >60 ML/MIN/1.73 M^2
GLUCOSE SERPL-MCNC: 88 MG/DL (ref 70–110)
HDLC SERPL-MCNC: 57 MG/DL (ref 40–75)
HDLC SERPL: 37.3 % (ref 20–50)
LDLC SERPL CALC-MCNC: 75 MG/DL (ref 63–159)
NONHDLC SERPL-MCNC: 96 MG/DL
POTASSIUM SERPL-SCNC: 4 MMOL/L (ref 3.5–5.1)
PROT SERPL-MCNC: 6.4 G/DL (ref 6–8.4)
SODIUM SERPL-SCNC: 140 MMOL/L (ref 136–145)
TRIGL SERPL-MCNC: 105 MG/DL (ref 30–150)

## 2020-02-07 PROCEDURE — 80061 LIPID PANEL: CPT

## 2020-02-07 PROCEDURE — 36415 COLL VENOUS BLD VENIPUNCTURE: CPT

## 2020-02-07 PROCEDURE — 80053 COMPREHEN METABOLIC PANEL: CPT

## 2020-02-11 DIAGNOSIS — I25.10 CORONARY ARTERY DISEASE WITHOUT ANGINA PECTORIS, UNSPECIFIED VESSEL OR LESION TYPE, UNSPECIFIED WHETHER NATIVE OR TRANSPLANTED HEART: Primary | ICD-10-CM

## 2020-02-14 ENCOUNTER — HOSPITAL ENCOUNTER (OUTPATIENT)
Dept: CARDIOLOGY | Facility: HOSPITAL | Age: 70
Discharge: HOME | End: 2020-02-14
Attending: INTERNAL MEDICINE
Payer: MEDICARE

## 2020-02-14 ENCOUNTER — OFFICE VISIT (OUTPATIENT)
Dept: CARDIOLOGY | Facility: CLINIC | Age: 70
End: 2020-02-14
Payer: MEDICARE

## 2020-02-14 VITALS
BODY MASS INDEX: 31.17 KG/M2 | HEIGHT: 60 IN | HEART RATE: 70 BPM | DIASTOLIC BLOOD PRESSURE: 88 MMHG | WEIGHT: 158.75 LBS | OXYGEN SATURATION: 94 % | SYSTOLIC BLOOD PRESSURE: 130 MMHG

## 2020-02-14 DIAGNOSIS — I25.10 CORONARY ARTERY DISEASE, ANGINA PRESENCE UNSPECIFIED, UNSPECIFIED VESSEL OR LESION TYPE, UNSPECIFIED WHETHER NATIVE OR TRANSPLANTED HEART: Primary | ICD-10-CM

## 2020-02-14 DIAGNOSIS — I25.10 CORONARY ARTERY DISEASE WITHOUT ANGINA PECTORIS, UNSPECIFIED VESSEL OR LESION TYPE, UNSPECIFIED WHETHER NATIVE OR TRANSPLANTED HEART: ICD-10-CM

## 2020-02-14 DIAGNOSIS — E78.5 HYPERLIPIDEMIA, UNSPECIFIED HYPERLIPIDEMIA TYPE: ICD-10-CM

## 2020-02-14 DIAGNOSIS — I25.2 OLD MI (MYOCARDIAL INFARCTION): ICD-10-CM

## 2020-02-14 DIAGNOSIS — I10 HYPERTENSION, UNSPECIFIED TYPE: ICD-10-CM

## 2020-02-14 DIAGNOSIS — Z98.61 S/P PTCA (PERCUTANEOUS TRANSLUMINAL CORONARY ANGIOPLASTY): ICD-10-CM

## 2020-02-14 PROCEDURE — 93010 EKG 12-LEAD: ICD-10-PCS | Mod: ,,, | Performed by: INTERNAL MEDICINE

## 2020-02-14 PROCEDURE — 99214 OFFICE O/P EST MOD 30 MIN: CPT | Mod: S$GLB,,, | Performed by: INTERNAL MEDICINE

## 2020-02-14 PROCEDURE — 1159F MED LIST DOCD IN RCRD: CPT | Mod: S$GLB,,, | Performed by: INTERNAL MEDICINE

## 2020-02-14 PROCEDURE — 3075F PR MOST RECENT SYSTOLIC BLOOD PRESS GE 130-139MM HG: ICD-10-PCS | Mod: CPTII,S$GLB,, | Performed by: INTERNAL MEDICINE

## 2020-02-14 PROCEDURE — 1159F PR MEDICATION LIST DOCUMENTED IN MEDICAL RECORD: ICD-10-PCS | Mod: S$GLB,,, | Performed by: INTERNAL MEDICINE

## 2020-02-14 PROCEDURE — 99999 PR PBB SHADOW E&M-EST. PATIENT-LVL III: CPT | Mod: PBBFAC,,, | Performed by: INTERNAL MEDICINE

## 2020-02-14 PROCEDURE — 3079F PR MOST RECENT DIASTOLIC BLOOD PRESSURE 80-89 MM HG: ICD-10-PCS | Mod: CPTII,S$GLB,, | Performed by: INTERNAL MEDICINE

## 2020-02-14 PROCEDURE — 93005 ELECTROCARDIOGRAM TRACING: CPT

## 2020-02-14 PROCEDURE — 99999 PR PBB SHADOW E&M-EST. PATIENT-LVL III: ICD-10-PCS | Mod: PBBFAC,,, | Performed by: INTERNAL MEDICINE

## 2020-02-14 PROCEDURE — 1126F AMNT PAIN NOTED NONE PRSNT: CPT | Mod: S$GLB,,, | Performed by: INTERNAL MEDICINE

## 2020-02-14 PROCEDURE — 93010 ELECTROCARDIOGRAM REPORT: CPT | Mod: ,,, | Performed by: INTERNAL MEDICINE

## 2020-02-14 PROCEDURE — 3079F DIAST BP 80-89 MM HG: CPT | Mod: CPTII,S$GLB,, | Performed by: INTERNAL MEDICINE

## 2020-02-14 PROCEDURE — 99214 PR OFFICE/OUTPT VISIT, EST, LEVL IV, 30-39 MIN: ICD-10-PCS | Mod: S$GLB,,, | Performed by: INTERNAL MEDICINE

## 2020-02-14 PROCEDURE — 3075F SYST BP GE 130 - 139MM HG: CPT | Mod: CPTII,S$GLB,, | Performed by: INTERNAL MEDICINE

## 2020-02-14 PROCEDURE — 1126F PR PAIN SEVERITY QUANTIFIED, NO PAIN PRESENT: ICD-10-PCS | Mod: S$GLB,,, | Performed by: INTERNAL MEDICINE

## 2020-02-14 RX ORDER — BUSPIRONE HYDROCHLORIDE 10 MG/1
10 TABLET ORAL 3 TIMES DAILY
COMMUNITY

## 2020-02-14 RX ORDER — PHENAZOPYRIDINE HYDROCHLORIDE 200 MG/1
200 TABLET, FILM COATED ORAL 3 TIMES DAILY PRN
COMMUNITY

## 2020-02-14 RX ORDER — ESCITALOPRAM OXALATE 10 MG/1
10 TABLET ORAL DAILY
COMMUNITY

## 2020-02-14 NOTE — PROGRESS NOTES
Subjective:   Patient ID:  Dottie Ramos is a 69 y.o. female who presents for follow up of Coronary Artery Disease and Shortness of Breath (with brisk walking [id number])      HPI  A 70 yo female with cad htn hlp old mi s/p lad diag stenting is here for f/u .she ahs no new complaints she ahs not been exercising claims compliance with meds and diet.has tightness in throat and shortness of breath is shee hurries up.she has been  Getting palpitation that are twice weekly not lasting except for few seconds.   Past Medical History:   Diagnosis Date    Coronary artery disease     Hyperlipidemia     Hypertension     Old MI (myocardial infarction)     S/P PTCA (percutaneous transluminal coronary angioplasty)        Past Surgical History:   Procedure Laterality Date    CORONARY ANGIOPLASTY         Social History     Tobacco Use    Smoking status: Former Smoker     Packs/day: 1.00     Years: 30.00     Pack years: 30.00     Last attempt to quit: 2004     Years since quittin.1    Smokeless tobacco: Never Used   Substance Use Topics    Alcohol use: No    Drug use: No       Family History   Problem Relation Age of Onset    Heart attack Father     Heart attack Brother     No Known Problems Mother     No Known Problems Sister     No Known Problems Maternal Aunt     No Known Problems Maternal Uncle     No Known Problems Paternal Aunt     No Known Problems Paternal Uncle     No Known Problems Maternal Grandmother     No Known Problems Maternal Grandfather     No Known Problems Paternal Grandmother     No Known Problems Paternal Grandfather     Asthma Neg Hx     Cancer Neg Hx     Diabetes Neg Hx     Emphysema Neg Hx     Heart failure Neg Hx     Hypertension Neg Hx     Asbestos Neg Hx        Current Outpatient Medications   Medication Sig    aspirin (ECOTRIN) 81 MG EC tablet Take 81 mg by mouth once daily.    busPIRone (BUSPAR) 10 MG tablet Take 10 mg by mouth 3 (three) times daily.    citalopram  (CELEXA) 20 MG tablet Take 20 mg by mouth once daily.    clopidogrel (PLAVIX) 75 mg tablet Take 75 mg by mouth once daily.    dicyclomine (BENTYL) 10 MG capsule Take 10 mg by mouth 3 (three) times daily.    escitalopram oxalate (LEXAPRO) 10 MG tablet Take 10 mg by mouth once daily.    losartan (COZAAR) 50 MG tablet Take 50 mg by mouth once daily.    metoprolol tartrate (LOPRESSOR) 25 MG tablet Take 25 mg by mouth 2 (two) times daily.    nitroGLYCERIN (NITROSTAT) 0.4 MG SL tablet Place 1 tablet (0.4 mg total) under the tongue every 5 (five) minutes as needed for Chest pain.    pantoprazole (PROTONIX) 40 MG tablet Take 40 mg by mouth 2 (two) times daily.    phenazopyridine (PYRIDIUM) 200 MG tablet Take 200 mg by mouth 3 (three) times daily as needed for Pain.    simvastatin (ZOCOR) 80 MG tablet Take 80 mg by mouth every evening.    zolpidem (AMBIEN) 5 MG Tab Take 5 mg by mouth nightly as needed.    vitamin D (VITAMIN D3) 1000 units Tab Take 2,000 Units by mouth once daily.     No current facility-administered medications for this visit.      Current Outpatient Medications on File Prior to Visit   Medication Sig    aspirin (ECOTRIN) 81 MG EC tablet Take 81 mg by mouth once daily.    busPIRone (BUSPAR) 10 MG tablet Take 10 mg by mouth 3 (three) times daily.    citalopram (CELEXA) 20 MG tablet Take 20 mg by mouth once daily.    clopidogrel (PLAVIX) 75 mg tablet Take 75 mg by mouth once daily.    dicyclomine (BENTYL) 10 MG capsule Take 10 mg by mouth 3 (three) times daily.    escitalopram oxalate (LEXAPRO) 10 MG tablet Take 10 mg by mouth once daily.    losartan (COZAAR) 50 MG tablet Take 50 mg by mouth once daily.    metoprolol tartrate (LOPRESSOR) 25 MG tablet Take 25 mg by mouth 2 (two) times daily.    nitroGLYCERIN (NITROSTAT) 0.4 MG SL tablet Place 1 tablet (0.4 mg total) under the tongue every 5 (five) minutes as needed for Chest pain.    pantoprazole (PROTONIX) 40 MG tablet Take 40 mg by mouth  2 (two) times daily.    phenazopyridine (PYRIDIUM) 200 MG tablet Take 200 mg by mouth 3 (three) times daily as needed for Pain.    simvastatin (ZOCOR) 80 MG tablet Take 80 mg by mouth every evening.    zolpidem (AMBIEN) 5 MG Tab Take 5 mg by mouth nightly as needed.    vitamin D (VITAMIN D3) 1000 units Tab Take 2,000 Units by mouth once daily.     No current facility-administered medications on file prior to visit.      Review of patient's allergies indicates:   Allergen Reactions    Macrodantin [nitrofurantoin macrocrystalline] Swelling     Pt reports occurrence of tongue swelling    Bactrim [sulfamethoxazole-trimethoprim] Rash    Pcn [penicillins] Rash     Review of Systems   Constitution: Negative for diaphoresis, malaise/fatigue and weight gain.   HENT: Negative for hoarse voice.    Eyes: Negative for double vision and visual disturbance.   Cardiovascular: Positive for palpitations. Negative for chest pain, claudication, cyanosis, dyspnea on exertion, irregular heartbeat, leg swelling, near-syncope, orthopnea, paroxysmal nocturnal dyspnea and syncope.   Respiratory: Positive for shortness of breath. Negative for cough, hemoptysis and snoring.    Hematologic/Lymphatic: Negative for bleeding problem. Does not bruise/bleed easily.   Skin: Negative for color change and poor wound healing.   Musculoskeletal: Negative for muscle cramps, muscle weakness and myalgias.   Gastrointestinal: Negative for bloating, abdominal pain, change in bowel habit, diarrhea, heartburn, hematemesis, hematochezia, melena and nausea.   Neurological: Negative for excessive daytime sleepiness, dizziness, headaches, light-headedness, loss of balance, numbness and weakness.   Psychiatric/Behavioral: Negative for memory loss. The patient does not have insomnia.    Allergic/Immunologic: Negative for hives.       Objective:   Physical Exam   Constitutional: She is oriented to person, place, and time. She appears well-developed and  well-nourished. She does not appear ill. No distress.   HENT:   Head: Normocephalic and atraumatic.   Eyes: Pupils are equal, round, and reactive to light. EOM are normal. No scleral icterus.   Neck: Normal range of motion. Neck supple. Normal carotid pulses, no hepatojugular reflux and no JVD present. Carotid bruit is not present. No tracheal deviation present. No thyromegaly present.   Cardiovascular: Normal rate, regular rhythm, normal heart sounds, intact distal pulses and normal pulses. Exam reveals no gallop and no friction rub.   No murmur heard.  Pulmonary/Chest: Effort normal and breath sounds normal. No respiratory distress. She has no wheezes. She has no rhonchi. She has no rales. She exhibits no tenderness.   Abdominal: Soft. Normal appearance, normal aorta and bowel sounds are normal. She exhibits no distension, no abdominal bruit, no ascites and no pulsatile midline mass. There is no hepatomegaly. There is no tenderness.   Musculoskeletal: She exhibits no edema.        Right shoulder: She exhibits no deformity.   Neurological: She is alert and oriented to person, place, and time. She has normal strength. No cranial nerve deficit. Coordination normal.   Skin: Skin is warm and dry. No rash noted. She is not diaphoretic. No cyanosis or erythema. Nails show no clubbing.   Psychiatric: She has a normal mood and affect. Her speech is normal and behavior is normal.   Nursing note and vitals reviewed.    Vitals:    02/14/20 1302 02/14/20 1304   BP: 126/86 130/88   BP Location: Right arm Left arm   Patient Position: Sitting Sitting   BP Method: Small (Manual) Small (Manual)   Pulse: 70    SpO2: (!) 94%    Weight: 72 kg (158 lb 11.7 oz)    Height: 5' (1.524 m)      Lab Results   Component Value Date    CHOL 153 02/07/2020    CHOL 152 07/31/2019    CHOL 149 01/07/2019     Lab Results   Component Value Date    HDL 57 02/07/2020    HDL 58 07/31/2019    HDL 49 01/07/2019     Lab Results   Component Value Date     LDLCALC 75.0 02/07/2020    LDLCALC 79.2 07/31/2019    LDLCALC 78.8 01/07/2019     Lab Results   Component Value Date    TRIG 105 02/07/2020    TRIG 74 07/31/2019    TRIG 106 01/07/2019     Lab Results   Component Value Date    CHOLHDL 37.3 02/07/2020    CHOLHDL 38.2 07/31/2019    CHOLHDL 32.9 01/07/2019       Chemistry        Component Value Date/Time     02/07/2020 1103    K 4.0 02/07/2020 1103     02/07/2020 1103    CO2 29 02/07/2020 1103    BUN 14 02/07/2020 1103    CREATININE 0.8 02/07/2020 1103    GLU 88 02/07/2020 1103        Component Value Date/Time    CALCIUM 9.9 02/07/2020 1103    ALKPHOS 56 02/07/2020 1103    AST 30 02/07/2020 1103    ALT 24 02/07/2020 1103    BILITOT 0.7 02/07/2020 1103    ESTGFRAFRICA >60.0 02/07/2020 1103    EGFRNONAA >60.0 02/07/2020 1103          Lab Results   Component Value Date    TSH 0.944 03/02/2015     Lab Results   Component Value Date    INR 1.0 01/12/2005     Lab Results   Component Value Date    WBC 4.89 03/02/2015    HGB 14.3 03/02/2015    HCT 41.7 03/02/2015    MCV 92 03/02/2015     03/02/2015     BMP  Sodium   Date Value Ref Range Status   02/07/2020 140 136 - 145 mmol/L Final     Potassium   Date Value Ref Range Status   02/07/2020 4.0 3.5 - 5.1 mmol/L Final     Chloride   Date Value Ref Range Status   02/07/2020 104 95 - 110 mmol/L Final     CO2   Date Value Ref Range Status   02/07/2020 29 23 - 29 mmol/L Final     BUN, Bld   Date Value Ref Range Status   02/07/2020 14 8 - 23 mg/dL Final     Creatinine   Date Value Ref Range Status   02/07/2020 0.8 0.5 - 1.4 mg/dL Final     Calcium   Date Value Ref Range Status   02/07/2020 9.9 8.7 - 10.5 mg/dL Final     Anion Gap   Date Value Ref Range Status   02/07/2020 7 (L) 8 - 16 mmol/L Final     eGFR if    Date Value Ref Range Status   02/07/2020 >60.0 >60 mL/min/1.73 m^2 Final     eGFR if non    Date Value Ref Range Status   02/07/2020 >60.0 >60 mL/min/1.73 m^2 Final      Comment:     Calculation used to obtain the estimated glomerular filtration  rate (eGFR) is the CKD-EPI equation.        CrCl cannot be calculated (Patient's most recent lab result is older than the maximum 7 days allowed.).    Assessment:     1. Coronary artery disease, angina presence unspecified, unspecified vessel or lesion type, unspecified whether native or transplanted heart    2. Hyperlipidemia, unspecified hyperlipidemia type    3. Hypertension, unspecified type    4. Old MI (myocardial infarction)    5. S/P PTCA (percutaneous transluminal coronary angioplasty)      Has some symptoms of shortness of breath and  throat tightness with exercise.will get a stress cardiolite   Has more frequent palpitation jose rafael valuate with holter.   Lipids on target.  Plan:   ett with cardiolite   Echo   holter

## 2020-02-28 ENCOUNTER — HOSPITAL ENCOUNTER (OUTPATIENT)
Dept: CARDIOLOGY | Facility: HOSPITAL | Age: 70
Discharge: HOME | End: 2020-02-28
Attending: INTERNAL MEDICINE
Payer: MEDICARE

## 2020-02-28 ENCOUNTER — HOSPITAL ENCOUNTER (OUTPATIENT)
Dept: RADIOLOGY | Facility: HOSPITAL | Age: 70
Discharge: HOME | End: 2020-02-28
Attending: INTERNAL MEDICINE
Payer: MEDICARE

## 2020-02-28 VITALS
DIASTOLIC BLOOD PRESSURE: 88 MMHG | HEIGHT: 60 IN | SYSTOLIC BLOOD PRESSURE: 130 MMHG | BODY MASS INDEX: 31.02 KG/M2 | WEIGHT: 158 LBS

## 2020-02-28 DIAGNOSIS — I25.10 CORONARY ARTERY DISEASE, ANGINA PRESENCE UNSPECIFIED, UNSPECIFIED VESSEL OR LESION TYPE, UNSPECIFIED WHETHER NATIVE OR TRANSPLANTED HEART: ICD-10-CM

## 2020-02-28 DIAGNOSIS — Z98.61 S/P PTCA (PERCUTANEOUS TRANSLUMINAL CORONARY ANGIOPLASTY): ICD-10-CM

## 2020-02-28 DIAGNOSIS — E78.5 HYPERLIPIDEMIA, UNSPECIFIED HYPERLIPIDEMIA TYPE: ICD-10-CM

## 2020-02-28 DIAGNOSIS — I25.2 OLD MI (MYOCARDIAL INFARCTION): ICD-10-CM

## 2020-02-28 LAB
AORTIC ROOT ANNULUS: 2.85 CM
AV INDEX (PROSTH): 0.87
AV MEAN GRADIENT: 4 MMHG
AV PEAK GRADIENT: 7 MMHG
AV VALVE AREA: 2.69 CM2
AV VELOCITY RATIO: 0.83
BSA FOR ECHO PROCEDURE: 1.74 M2
CV ECHO LV RWT: 0.73 CM
DOP CALC AO PEAK VEL: 1.33 M/S
DOP CALC AO VTI: 32.09 CM
DOP CALC LVOT AREA: 3.1 CM2
DOP CALC LVOT DIAMETER: 1.98 CM
DOP CALC LVOT PEAK VEL: 1.1 M/S
DOP CALC LVOT STROKE VOLUME: 86.29 CM3
DOP CALCLVOT PEAK VEL VTI: 28.04 CM
E WAVE DECELERATION TIME: 235.11 MSEC
E/A RATIO: 0.7
E/E' RATIO: 11.82 M/S
ECHO LV POSTERIOR WALL: 1.24 CM (ref 0.6–1.1)
FRACTIONAL SHORTENING: 38 % (ref 28–44)
INTERVENTRICULAR SEPTUM: 1.33 CM (ref 0.6–1.1)
LA MAJOR: 3.62 CM
LA MINOR: 4.28 CM
LA WIDTH: 2.71 CM
LEFT ATRIUM SIZE: 3.07 CM
LEFT ATRIUM VOLUME INDEX: 16.4 ML/M2
LEFT ATRIUM VOLUME: 27.74 CM3
LEFT INTERNAL DIMENSION IN SYSTOLE: 2.12 CM (ref 2.1–4)
LEFT VENTRICLE DIASTOLIC VOLUME INDEX: 28.22 ML/M2
LEFT VENTRICLE DIASTOLIC VOLUME: 47.66 ML
LEFT VENTRICLE MASS INDEX: 86 G/M2
LEFT VENTRICLE SYSTOLIC VOLUME INDEX: 8.7 ML/M2
LEFT VENTRICLE SYSTOLIC VOLUME: 14.77 ML
LEFT VENTRICULAR INTERNAL DIMENSION IN DIASTOLE: 3.41 CM (ref 3.5–6)
LEFT VENTRICULAR MASS: 145.53 G
LV LATERAL E/E' RATIO: 10.83 M/S
LV SEPTAL E/E' RATIO: 13 M/S
MV PEAK A VEL: 0.93 M/S
MV PEAK E VEL: 0.65 M/S
PULM VEIN S/D RATIO: 2.17
PV PEAK D VEL: 0.3 M/S
PV PEAK S VEL: 0.65 M/S
PV PEAK VELOCITY: 0.71 CM/S
RA MAJOR: 4.48 CM
RA PRESSURE: 3 MMHG
RA WIDTH: 2.01 CM
RIGHT VENTRICULAR END-DIASTOLIC DIMENSION: 2.08 CM
SINUS: 3.02 CM
STJ: 2.87 CM
TDI LATERAL: 0.06 M/S
TDI SEPTAL: 0.05 M/S
TDI: 0.06 M/S
TRICUSPID ANNULAR PLANE SYSTOLIC EXCURSION: 2.42 CM

## 2020-02-28 PROCEDURE — A9502 TC99M TETROFOSMIN: HCPCS

## 2020-02-28 PROCEDURE — 93306 TTE W/DOPPLER COMPLETE: CPT

## 2020-02-28 PROCEDURE — 93018 CV STRESS TEST I&R ONLY: CPT | Mod: ,,, | Performed by: INTERNAL MEDICINE

## 2020-02-28 PROCEDURE — 78452 HT MUSCLE IMAGE SPECT MULT: CPT | Mod: 26,,, | Performed by: INTERNAL MEDICINE

## 2020-02-28 PROCEDURE — 93227 XTRNL ECG REC<48 HR R&I: CPT | Mod: ,,, | Performed by: INTERNAL MEDICINE

## 2020-02-28 PROCEDURE — 93018 STRESS TEST WITH MYOCARDIAL PERFUSION (CUPID ONLY): ICD-10-PCS | Mod: ,,, | Performed by: INTERNAL MEDICINE

## 2020-02-28 PROCEDURE — 78452 STRESS TEST WITH MYOCARDIAL PERFUSION (CUPID ONLY): ICD-10-PCS | Mod: 26,,, | Performed by: INTERNAL MEDICINE

## 2020-02-28 PROCEDURE — 93306 TTE W/DOPPLER COMPLETE: CPT | Mod: 26,,, | Performed by: INTERNAL MEDICINE

## 2020-02-28 PROCEDURE — 93227 HOLTER MONITOR - 48 HOUR (CUPID ONLY): ICD-10-PCS | Mod: ,,, | Performed by: INTERNAL MEDICINE

## 2020-02-28 PROCEDURE — 93017 CV STRESS TEST TRACING ONLY: CPT

## 2020-02-28 PROCEDURE — 93016 CV STRESS TEST SUPVJ ONLY: CPT | Mod: ,,, | Performed by: INTERNAL MEDICINE

## 2020-02-28 PROCEDURE — 93226 XTRNL ECG REC<48 HR SCAN A/R: CPT

## 2020-02-28 PROCEDURE — 93016 STRESS TEST WITH MYOCARDIAL PERFUSION (CUPID ONLY): ICD-10-PCS | Mod: ,,, | Performed by: INTERNAL MEDICINE

## 2020-02-28 PROCEDURE — 93306 ECHO (CUPID ONLY): ICD-10-PCS | Mod: 26,,, | Performed by: INTERNAL MEDICINE

## 2020-03-02 ENCOUNTER — TELEPHONE (OUTPATIENT)
Dept: CARDIOLOGY | Facility: CLINIC | Age: 70
End: 2020-03-02

## 2020-03-02 LAB
CV STRESS BASE HR: 73 BPM
DIASTOLIC BLOOD PRESSURE: 85 MMHG
NUC REST EJECTION FRACTION: 78
NUC STRESS EJECTION FRACTION: 79 %
OHS CV CPX 1 MINUTE RECOVERY HEART RATE: 96 BPM
OHS CV CPX 85 PERCENT MAX PREDICTED HEART RATE MALE: 122
OHS CV CPX ESTIMATED METS: 5
OHS CV CPX MAX PREDICTED HEART RATE: 144
OHS CV CPX PATIENT IS FEMALE: 1
OHS CV CPX PATIENT IS MALE: 0
OHS CV CPX PEAK DIASTOLIC BLOOD PRESSURE: 116 MMHG
OHS CV CPX PEAK HEAR RATE: 130 BPM
OHS CV CPX PEAK RATE PRESSURE PRODUCT: NORMAL
OHS CV CPX PEAK SYSTOLIC BLOOD PRESSURE: 205 MMHG
OHS CV CPX PERCENT MAX PREDICTED HEART RATE ACHIEVED: 91
OHS CV CPX RATE PRESSURE PRODUCT PRESENTING: NORMAL
OHS CV EVENT MONITOR DAY: 2
OHS CV HOLTER LENGTH DECIMAL HOURS: 96
OHS CV HOLTER LENGTH HOURS: 48
OHS CV HOLTER LENGTH MINUTES: 0
STRESS ECHO POST EXERCISE DUR MIN: 4 MINUTES
STRESS ECHO POST EXERCISE DUR SEC: 42 SECONDS
STRESS ECHO TARGET HR: 127 BPM
SYSTOLIC BLOOD PRESSURE: 166 MMHG

## 2020-03-02 NOTE — TELEPHONE ENCOUNTER
Patient was notified of results of echo. All questions were answered. Pt verbalized understanding. Pt will call back with any other questions or concerns.    Notes recorded by Marylou Pal MD on 3/1/2020 at 5:02 PM CST  HEART FUNCTIO NORMAL

## 2020-03-02 NOTE — TELEPHONE ENCOUNTER
Patient was notified of results of stress test. All questions were answered. Pt verbalized understanding. Pt will call back with any other questions or concerns.    Notes recorded by Marylou Pal MD on 3/2/2020 at 8:57 AM CST  STRESS TEST LOOKS GOOD      ----- Message from Malena Simmons sent at 3/2/2020 12:36 PM CST -----  Contact: pt  Pt called to get lab results and can be reached at [phone]      Thanks,  Malena Simmons

## 2020-03-02 NOTE — TELEPHONE ENCOUNTER
Attempted to contact patient to discuss stress test results. Left voicemail to call back.    Notes recorded by Marylou Pal MD on 3/2/2020 at 8:57 AM CST  STRESS TEST LOOKS GOOD

## 2020-03-03 ENCOUNTER — TELEPHONE (OUTPATIENT)
Dept: CARDIOLOGY | Facility: CLINIC | Age: 70
End: 2020-03-03

## 2020-03-03 NOTE — TELEPHONE ENCOUNTER
Called patient and gave results of holter monitor--all questions answered--patient verbalizes understanding of all information

## 2020-03-03 NOTE — TELEPHONE ENCOUNTER
----- Message from Marylou aPl MD sent at 3/2/2020 10:05 PM CST -----  Few skipped beats on monitor no significant rhythm issues

## 2021-11-04 ENCOUNTER — NURSE TRIAGE (OUTPATIENT)
Dept: ADMINISTRATIVE | Facility: CLINIC | Age: 71
End: 2021-11-04
Payer: MEDICARE

## 2021-11-04 DIAGNOSIS — R06.02 SOB (SHORTNESS OF BREATH): Primary | ICD-10-CM

## 2021-11-11 ENCOUNTER — OFFICE VISIT (OUTPATIENT)
Dept: PULMONOLOGY | Facility: CLINIC | Age: 71
End: 2021-11-11
Payer: MEDICARE

## 2021-11-11 ENCOUNTER — HOSPITAL ENCOUNTER (OUTPATIENT)
Dept: RADIOLOGY | Facility: HOSPITAL | Age: 71
Discharge: HOME | End: 2021-11-11
Attending: NURSE PRACTITIONER
Payer: MEDICARE

## 2021-11-11 VITALS
DIASTOLIC BLOOD PRESSURE: 88 MMHG | SYSTOLIC BLOOD PRESSURE: 142 MMHG | HEART RATE: 79 BPM | HEIGHT: 60 IN | RESPIRATION RATE: 16 BRPM | BODY MASS INDEX: 32.28 KG/M2 | OXYGEN SATURATION: 97 % | WEIGHT: 164.44 LBS

## 2021-11-11 DIAGNOSIS — R06.02 SOB (SHORTNESS OF BREATH): ICD-10-CM

## 2021-11-11 DIAGNOSIS — G47.00 INSOMNIA, UNSPECIFIED TYPE: ICD-10-CM

## 2021-11-11 DIAGNOSIS — J20.9 ACUTE BRONCHITIS, UNSPECIFIED ORGANISM: ICD-10-CM

## 2021-11-11 DIAGNOSIS — Z87.891 HX OF SMOKING: ICD-10-CM

## 2021-11-11 DIAGNOSIS — R05.9 COUGH: Primary | ICD-10-CM

## 2021-11-11 PROCEDURE — 99204 PR OFFICE/OUTPT VISIT, NEW, LEVL IV, 45-59 MIN: ICD-10-PCS | Mod: S$GLB,,, | Performed by: NURSE PRACTITIONER

## 2021-11-11 PROCEDURE — 3288F PR FALLS RISK ASSESSMENT DOCUMENTED: ICD-10-PCS | Mod: CPTII,S$GLB,, | Performed by: NURSE PRACTITIONER

## 2021-11-11 PROCEDURE — 3079F DIAST BP 80-89 MM HG: CPT | Mod: CPTII,S$GLB,, | Performed by: NURSE PRACTITIONER

## 2021-11-11 PROCEDURE — 1159F PR MEDICATION LIST DOCUMENTED IN MEDICAL RECORD: ICD-10-PCS | Mod: CPTII,S$GLB,, | Performed by: NURSE PRACTITIONER

## 2021-11-11 PROCEDURE — 3288F FALL RISK ASSESSMENT DOCD: CPT | Mod: CPTII,S$GLB,, | Performed by: NURSE PRACTITIONER

## 2021-11-11 PROCEDURE — 3077F SYST BP >= 140 MM HG: CPT | Mod: CPTII,S$GLB,, | Performed by: NURSE PRACTITIONER

## 2021-11-11 PROCEDURE — 99204 OFFICE O/P NEW MOD 45 MIN: CPT | Mod: S$GLB,,, | Performed by: NURSE PRACTITIONER

## 2021-11-11 PROCEDURE — 99999 PR PBB SHADOW E&M-EST. PATIENT-LVL V: CPT | Mod: PBBFAC,,, | Performed by: NURSE PRACTITIONER

## 2021-11-11 PROCEDURE — 3008F PR BODY MASS INDEX (BMI) DOCUMENTED: ICD-10-PCS | Mod: CPTII,S$GLB,, | Performed by: NURSE PRACTITIONER

## 2021-11-11 PROCEDURE — 1160F PR REVIEW ALL MEDS BY PRESCRIBER/CLIN PHARMACIST DOCUMENTED: ICD-10-PCS | Mod: CPTII,S$GLB,, | Performed by: NURSE PRACTITIONER

## 2021-11-11 PROCEDURE — 1101F PR PT FALLS ASSESS DOC 0-1 FALLS W/OUT INJ PAST YR: ICD-10-PCS | Mod: CPTII,S$GLB,, | Performed by: NURSE PRACTITIONER

## 2021-11-11 PROCEDURE — 3008F BODY MASS INDEX DOCD: CPT | Mod: CPTII,S$GLB,, | Performed by: NURSE PRACTITIONER

## 2021-11-11 PROCEDURE — 71046 XR CHEST PA AND LATERAL: ICD-10-PCS | Mod: 26,,, | Performed by: RADIOLOGY

## 2021-11-11 PROCEDURE — 4010F ACE/ARB THERAPY RXD/TAKEN: CPT | Mod: CPTII,S$GLB,, | Performed by: NURSE PRACTITIONER

## 2021-11-11 PROCEDURE — 4010F PR ACE/ARB THEARPY RXD/TAKEN: ICD-10-PCS | Mod: CPTII,S$GLB,, | Performed by: NURSE PRACTITIONER

## 2021-11-11 PROCEDURE — 99999 PR PBB SHADOW E&M-EST. PATIENT-LVL V: ICD-10-PCS | Mod: PBBFAC,,, | Performed by: NURSE PRACTITIONER

## 2021-11-11 PROCEDURE — 1159F MED LIST DOCD IN RCRD: CPT | Mod: CPTII,S$GLB,, | Performed by: NURSE PRACTITIONER

## 2021-11-11 PROCEDURE — 3079F PR MOST RECENT DIASTOLIC BLOOD PRESSURE 80-89 MM HG: ICD-10-PCS | Mod: CPTII,S$GLB,, | Performed by: NURSE PRACTITIONER

## 2021-11-11 PROCEDURE — 71046 X-RAY EXAM CHEST 2 VIEWS: CPT | Mod: TC,FY,PO

## 2021-11-11 PROCEDURE — 1160F RVW MEDS BY RX/DR IN RCRD: CPT | Mod: CPTII,S$GLB,, | Performed by: NURSE PRACTITIONER

## 2021-11-11 PROCEDURE — 1101F PT FALLS ASSESS-DOCD LE1/YR: CPT | Mod: CPTII,S$GLB,, | Performed by: NURSE PRACTITIONER

## 2021-11-11 PROCEDURE — 3077F PR MOST RECENT SYSTOLIC BLOOD PRESSURE >= 140 MM HG: ICD-10-PCS | Mod: CPTII,S$GLB,, | Performed by: NURSE PRACTITIONER

## 2021-11-11 PROCEDURE — 71046 X-RAY EXAM CHEST 2 VIEWS: CPT | Mod: 26,,, | Performed by: RADIOLOGY

## 2021-11-11 RX ORDER — BENZONATATE 200 MG/1
200 CAPSULE ORAL 3 TIMES DAILY PRN
Qty: 45 CAPSULE | Refills: 2 | Status: SHIPPED | OUTPATIENT
Start: 2021-11-11 | End: 2023-01-10

## 2021-11-11 RX ORDER — CEPHALEXIN 500 MG/1
500 CAPSULE ORAL 2 TIMES DAILY
COMMUNITY
Start: 2021-10-09

## 2021-11-11 RX ORDER — ALBUTEROL SULFATE 90 UG/1
AEROSOL, METERED RESPIRATORY (INHALATION)
COMMUNITY
Start: 2021-10-19

## 2021-11-11 RX ORDER — METHYLPREDNISOLONE 4 MG/1
TABLET ORAL
Qty: 1 EACH | Refills: 0 | Status: SHIPPED | OUTPATIENT
Start: 2021-11-11 | End: 2022-02-02

## 2021-11-11 RX ORDER — ESZOPICLONE 3 MG/1
3 TABLET, FILM COATED ORAL NIGHTLY PRN
COMMUNITY
Start: 2021-07-29

## 2021-11-11 RX ORDER — DOXEPIN HYDROCHLORIDE 100 MG/1
CAPSULE ORAL
COMMUNITY
Start: 2021-10-27

## 2021-11-11 RX ORDER — FLUTICASONE FUROATE 100 UG/1
1 POWDER RESPIRATORY (INHALATION) DAILY
Qty: 30 EACH | Refills: 11 | Status: SHIPPED | OUTPATIENT
Start: 2021-11-11 | End: 2023-02-15

## 2022-02-02 ENCOUNTER — OFFICE VISIT (OUTPATIENT)
Dept: SLEEP MEDICINE | Facility: CLINIC | Age: 72
End: 2022-02-02
Payer: MEDICARE

## 2022-02-02 ENCOUNTER — CLINICAL SUPPORT (OUTPATIENT)
Dept: PULMONOLOGY | Facility: CLINIC | Age: 72
End: 2022-02-02
Payer: MEDICARE

## 2022-02-02 VITALS
WEIGHT: 166.44 LBS | OXYGEN SATURATION: 96 % | BODY MASS INDEX: 32.68 KG/M2 | HEIGHT: 60 IN | SYSTOLIC BLOOD PRESSURE: 120 MMHG | RESPIRATION RATE: 17 BRPM | DIASTOLIC BLOOD PRESSURE: 80 MMHG | HEART RATE: 75 BPM

## 2022-02-02 DIAGNOSIS — R06.2 WHEEZING: ICD-10-CM

## 2022-02-02 DIAGNOSIS — K21.9 GASTROESOPHAGEAL REFLUX DISEASE, UNSPECIFIED WHETHER ESOPHAGITIS PRESENT: ICD-10-CM

## 2022-02-02 DIAGNOSIS — J20.9 ACUTE BRONCHITIS, UNSPECIFIED ORGANISM: ICD-10-CM

## 2022-02-02 DIAGNOSIS — R05.9 COUGH: ICD-10-CM

## 2022-02-02 DIAGNOSIS — G47.00 INSOMNIA, UNSPECIFIED TYPE: ICD-10-CM

## 2022-02-02 DIAGNOSIS — R05.9 COUGH: Primary | ICD-10-CM

## 2022-02-02 DIAGNOSIS — Z01.818 PRE-PROCEDURAL EXAMINATION: Primary | ICD-10-CM

## 2022-02-02 LAB
BRPFT: NORMAL
CTP QC/QA: YES
FEF 25 75 LLN: 0.72
FEF 25 75 PRE REF: 86.7 %
FEF 25 75 REF: 1.61
FEV1 FVC LLN: 65
FEV1 FVC PRE REF: 97.8 %
FEV1 FVC REF: 78
FEV1 LLN: 1.32
FEV1 PRE REF: 87.2 %
FEV1 REF: 1.84
FVC LLN: 1.7
FVC PRE REF: 88.6 %
FVC REF: 2.36
PEF LLN: 3.3
PEF PRE REF: 82 %
PEF REF: 4.79
PRE FEF 25 75: 1.39 L/S
PRE FET 100: 7.94 SEC
PRE FEV1 FVC: 76.58 %
PRE FEV1: 1.6 L
PRE FVC: 2.09 L
PRE PEF: 3.93 L/S
SARS-COV-2 AG RESP QL IA.RAPID: NEGATIVE

## 2022-02-02 PROCEDURE — 87811 SARS CORONAVIRUS 2 ANTIGEN POCT, MANUAL READ: ICD-10-PCS | Mod: S$GLB,,, | Performed by: NURSE PRACTITIONER

## 2022-02-02 PROCEDURE — 94010 BREATHING CAPACITY TEST: CPT | Mod: S$GLB,,, | Performed by: INTERNAL MEDICINE

## 2022-02-02 PROCEDURE — 3288F PR FALLS RISK ASSESSMENT DOCUMENTED: ICD-10-PCS | Mod: CPTII,S$GLB,, | Performed by: NURSE PRACTITIONER

## 2022-02-02 PROCEDURE — 99214 PR OFFICE/OUTPT VISIT, EST, LEVL IV, 30-39 MIN: ICD-10-PCS | Mod: 25,S$GLB,, | Performed by: NURSE PRACTITIONER

## 2022-02-02 PROCEDURE — 3288F FALL RISK ASSESSMENT DOCD: CPT | Mod: CPTII,S$GLB,, | Performed by: NURSE PRACTITIONER

## 2022-02-02 PROCEDURE — 99999 PR PBB SHADOW E&M-EST. PATIENT-LVL IV: CPT | Mod: PBBFAC,,, | Performed by: NURSE PRACTITIONER

## 2022-02-02 PROCEDURE — 1101F PT FALLS ASSESS-DOCD LE1/YR: CPT | Mod: CPTII,S$GLB,, | Performed by: NURSE PRACTITIONER

## 2022-02-02 PROCEDURE — 3074F SYST BP LT 130 MM HG: CPT | Mod: CPTII,S$GLB,, | Performed by: NURSE PRACTITIONER

## 2022-02-02 PROCEDURE — 99214 OFFICE O/P EST MOD 30 MIN: CPT | Mod: 25,S$GLB,, | Performed by: NURSE PRACTITIONER

## 2022-02-02 PROCEDURE — 1159F MED LIST DOCD IN RCRD: CPT | Mod: CPTII,S$GLB,, | Performed by: NURSE PRACTITIONER

## 2022-02-02 PROCEDURE — 3079F DIAST BP 80-89 MM HG: CPT | Mod: CPTII,S$GLB,, | Performed by: NURSE PRACTITIONER

## 2022-02-02 PROCEDURE — 87811 SARS-COV-2 COVID19 W/OPTIC: CPT | Mod: S$GLB,,, | Performed by: NURSE PRACTITIONER

## 2022-02-02 PROCEDURE — 3079F PR MOST RECENT DIASTOLIC BLOOD PRESSURE 80-89 MM HG: ICD-10-PCS | Mod: CPTII,S$GLB,, | Performed by: NURSE PRACTITIONER

## 2022-02-02 PROCEDURE — 3074F PR MOST RECENT SYSTOLIC BLOOD PRESSURE < 130 MM HG: ICD-10-PCS | Mod: CPTII,S$GLB,, | Performed by: NURSE PRACTITIONER

## 2022-02-02 PROCEDURE — 94010 BREATHING CAPACITY TEST: ICD-10-PCS | Mod: S$GLB,,, | Performed by: INTERNAL MEDICINE

## 2022-02-02 PROCEDURE — 1160F RVW MEDS BY RX/DR IN RCRD: CPT | Mod: CPTII,S$GLB,, | Performed by: NURSE PRACTITIONER

## 2022-02-02 PROCEDURE — 99999 PR PBB SHADOW E&M-EST. PATIENT-LVL IV: ICD-10-PCS | Mod: PBBFAC,,, | Performed by: NURSE PRACTITIONER

## 2022-02-02 PROCEDURE — 1101F PR PT FALLS ASSESS DOC 0-1 FALLS W/OUT INJ PAST YR: ICD-10-PCS | Mod: CPTII,S$GLB,, | Performed by: NURSE PRACTITIONER

## 2022-02-02 PROCEDURE — 3008F PR BODY MASS INDEX (BMI) DOCUMENTED: ICD-10-PCS | Mod: CPTII,S$GLB,, | Performed by: NURSE PRACTITIONER

## 2022-02-02 PROCEDURE — 1159F PR MEDICATION LIST DOCUMENTED IN MEDICAL RECORD: ICD-10-PCS | Mod: CPTII,S$GLB,, | Performed by: NURSE PRACTITIONER

## 2022-02-02 PROCEDURE — 1160F PR REVIEW ALL MEDS BY PRESCRIBER/CLIN PHARMACIST DOCUMENTED: ICD-10-PCS | Mod: CPTII,S$GLB,, | Performed by: NURSE PRACTITIONER

## 2022-02-02 PROCEDURE — 3008F BODY MASS INDEX DOCD: CPT | Mod: CPTII,S$GLB,, | Performed by: NURSE PRACTITIONER

## 2022-02-02 NOTE — PROGRESS NOTES
Subjective:      Patient ID: Dottie Ramos is a 71 y.o. female.    Chief Complaint: Cough    HPI  Presents for cough. Associated with wheezing. Wheezing and cough worse when the sun goes down. Worsening over the last few months.   She has GERD and has noticed some symptoms  She has had episode of wheezing for 5-10 years.   Hx sub 6mm lung nodules 2011, stable 2016.   Started on Arnuity and it has improved her cough significantly. He has milder cough in the evening.  She has insomnia and has seen [place] [facility] and has follow up soon.    Patient Active Problem List   Diagnosis    Coronary artery disease    Hyperlipidemia    Hypertension    Old MI (myocardial infarction)    S/P PTCA (percutaneous transluminal coronary angioplasty)    Coronary atherosclerosis of native coronary artery       /80   Pulse 75   Resp 17   Ht 5' (1.524 m)   Wt 75.5 kg (166 lb 7.2 oz)   SpO2 96%   BMI 32.51 kg/m²   Body mass index is 32.51 kg/m².    Review of Systems   Respiratory: Positive for cough.    Psychiatric/Behavioral: Positive for sleep disturbance.     Objective:      Physical Exam  Constitutional:       Appearance: Normal appearance. She is well-developed.   HENT:      Head: Normocephalic and atraumatic.      Mouth/Throat:      Comments: Wearing mask due to covid concerns  Cardiovascular:      Rate and Rhythm: Normal rate and regular rhythm.      Heart sounds: No murmur heard.  No gallop.    Pulmonary:      Effort: Pulmonary effort is normal.      Breath sounds: Normal breath sounds.   Abdominal:      Palpations: Abdomen is soft. There is no mass.      Tenderness: There is no abdominal tenderness.   Musculoskeletal:         General: Normal range of motion.      Cervical back: Normal range of motion and neck supple.   Skin:     General: Skin is warm and dry.   Neurological:      Mental Status: She is alert and oriented to person, place, and time.   Psychiatric:         Mood and Affect: Mood normal.          Behavior: Behavior normal.       Personal Diagnostic Review  Spirometry: Difficulty performing  Results for orders placed or performed in visit on 02/02/22   Spirometry with/without bronchodilator   Result Value Ref Range    Pre FVC 2.09 L    Pre FEV1 1.60 L    Pre FEV1 FVC 76.58 %    Pre FEF 25 75 1.39 L/s    Pre PEF 3.93 L/s    Pre  7.94 sec    FVC Ref 2.36     FVC LLN 1.70     FVC Pre Ref 88.6 %    FEV1 Ref 1.84     FEV1 LLN 1.32     FEV1 Pre Ref 87.2 %    FEV1 FVC Ref 78     FEV1 FVC LLN 65     FEV1 FVC Pre Ref 97.8 %    FEF 25 75 Ref 1.61     FEF 25 75 LLN 0.72     FEF 25 75 Pre Ref 86.7 %    PEF Ref 4.79     PEF LLN 3.30     PEF Pre Ref 82.0 %       Results for orders placed during the hospital encounter of 11/11/21    X-Ray Chest PA And Lateral    Narrative  EXAMINATION:  XR CHEST PA AND LATERAL    CLINICAL HISTORY:  Shortness of breath    TECHNIQUE:  PA and lateral views of the chest were performed.    COMPARISON:  04/26/2011    FINDINGS:  Cardiac silhouette and mediastinal contours are normal.  Lungs are clear.  Osseous structures are intact.    Impression  No acute cardiopulmonary process.      Electronically signed by: Arnulfo Bailon MD  Date:    11/11/2021  Time:    15:29        Assessment:       1. Cough    2. Wheezing    3. Gastroesophageal reflux disease, unspecified whether esophagitis present        Outpatient Encounter Medications as of 2/2/2022   Medication Sig Dispense Refill    albuterol (PROVENTIL/VENTOLIN HFA) 90 mcg/actuation inhaler Inhale into the lungs.      aspirin (ECOTRIN) 81 MG EC tablet Take 81 mg by mouth once daily.      busPIRone (BUSPAR) 10 MG tablet Take 10 mg by mouth 3 (three) times daily.      cephALEXin (KEFLEX) 500 MG capsule Take 500 mg by mouth 2 (two) times daily.      citalopram (CELEXA) 20 MG tablet Take 20 mg by mouth once daily.      clopidogrel (PLAVIX) 75 mg tablet Take 75 mg by mouth once daily.      dicyclomine (BENTYL) 10 MG capsule Take 10 mg by  mouth 3 (three) times daily.      escitalopram oxalate (LEXAPRO) 10 MG tablet Take 10 mg by mouth once daily.      eszopiclone (LUNESTA) 3 mg Tab Take 3 mg by mouth nightly as needed.      fluticasone furoate (ARNUITY ELLIPTA) 100 mcg/actuation inhaler Inhale 1 puff into the lungs once daily. 30 each 11    losartan (COZAAR) 50 MG tablet Take 50 mg by mouth once daily.      metoprolol tartrate (LOPRESSOR) 25 MG tablet Take 25 mg by mouth 2 (two) times daily.      pantoprazole (PROTONIX) 40 MG tablet Take 40 mg by mouth 2 (two) times daily.      phenazopyridine (PYRIDIUM) 200 MG tablet Take 200 mg by mouth 3 (three) times daily as needed for Pain.      simvastatin (ZOCOR) 80 MG tablet Take 80 mg by mouth every evening.      vitamin D (VITAMIN D3) 1000 units Tab Take 2,000 Units by mouth once daily.      zolpidem (AMBIEN) 5 MG Tab Take 5 mg by mouth nightly as needed.      [DISCONTINUED] methylPREDNISolone (MEDROL DOSEPACK) 4 mg tablet use as directed 1 each 0    doxepin (SINEQUAN) 100 MG capsule TAKE ONE CAPSULE BY MOUTH AT BEDTIME 1 AND 1/2 HOURS PRIOR TO SLEEP      nitroGLYCERIN (NITROSTAT) 0.4 MG SL tablet Place 1 tablet (0.4 mg total) under the tongue every 5 (five) minutes as needed for Chest pain. 60 tablet 12     No facility-administered encounter medications on file as of 2/2/2022.     No orders of the defined types were placed in this encounter.    Plan:   Cough improved with ICS. Continue arnuity.   Reflux precautions.   Follow up 6 months     Problem List Items Addressed This Visit    None     Visit Diagnoses     Cough    -  Primary    Wheezing        Gastroesophageal reflux disease, unspecified whether esophagitis present

## 2022-07-07 ENCOUNTER — TELEPHONE (OUTPATIENT)
Dept: PULMONOLOGY | Facility: CLINIC | Age: 72
End: 2022-07-07
Payer: MEDICARE

## 2023-01-27 ENCOUNTER — TELEPHONE (OUTPATIENT)
Dept: CARDIOLOGY | Facility: CLINIC | Age: 73
End: 2023-01-27
Payer: MEDICARE

## 2023-01-27 DIAGNOSIS — I10 HYPERTENSION, UNSPECIFIED TYPE: Primary | ICD-10-CM

## 2023-01-27 NOTE — TELEPHONE ENCOUNTER
U/a to lvm due to vm not set up:     [facility] but date scheduled for pt is the next available so that her urgent concerns are addressed.

## 2023-01-27 NOTE — TELEPHONE ENCOUNTER
----- Message from Carmela Ramírez sent at 1/27/2023  9:20 AM CST -----  Contact: [phone]  Patient would like to consult with a nurse in regards to her scheduled appt. The patient stated she prefers to be seen at the [facility]. She is also requesting a morning appt. Please call back at [phone]. Thanks yadiel

## 2023-02-14 DIAGNOSIS — R06.02 SOB (SHORTNESS OF BREATH): Primary | ICD-10-CM

## 2023-02-15 ENCOUNTER — HOSPITAL ENCOUNTER (OUTPATIENT)
Dept: RADIOLOGY | Facility: HOSPITAL | Age: 73
Discharge: HOME | End: 2023-02-15
Attending: NURSE PRACTITIONER
Payer: MEDICARE

## 2023-02-15 ENCOUNTER — CLINICAL SUPPORT (OUTPATIENT)
Dept: PULMONOLOGY | Facility: CLINIC | Age: 73
End: 2023-02-15
Payer: MEDICARE

## 2023-02-15 ENCOUNTER — OFFICE VISIT (OUTPATIENT)
Dept: SLEEP MEDICINE | Facility: CLINIC | Age: 73
End: 2023-02-15
Payer: MEDICARE

## 2023-02-15 VITALS
HEART RATE: 75 BPM | HEIGHT: 60 IN | WEIGHT: 168.88 LBS | OXYGEN SATURATION: 98 % | SYSTOLIC BLOOD PRESSURE: 120 MMHG | BODY MASS INDEX: 33.15 KG/M2 | RESPIRATION RATE: 20 BRPM | DIASTOLIC BLOOD PRESSURE: 82 MMHG

## 2023-02-15 DIAGNOSIS — J45.909 REACTIVE AIRWAY DISEASE WITHOUT COMPLICATION, UNSPECIFIED ASTHMA SEVERITY, UNSPECIFIED WHETHER PERSISTENT: ICD-10-CM

## 2023-02-15 DIAGNOSIS — R06.02 SOB (SHORTNESS OF BREATH): ICD-10-CM

## 2023-02-15 DIAGNOSIS — R05.9 COUGH, UNSPECIFIED TYPE: Primary | ICD-10-CM

## 2023-02-15 PROCEDURE — 3008F BODY MASS INDEX DOCD: CPT | Mod: CPTII,S$GLB,, | Performed by: NURSE PRACTITIONER

## 2023-02-15 PROCEDURE — 1101F PR PT FALLS ASSESS DOC 0-1 FALLS W/OUT INJ PAST YR: ICD-10-PCS | Mod: CPTII,S$GLB,, | Performed by: NURSE PRACTITIONER

## 2023-02-15 PROCEDURE — 1160F RVW MEDS BY RX/DR IN RCRD: CPT | Mod: CPTII,S$GLB,, | Performed by: NURSE PRACTITIONER

## 2023-02-15 PROCEDURE — 3079F DIAST BP 80-89 MM HG: CPT | Mod: CPTII,S$GLB,, | Performed by: NURSE PRACTITIONER

## 2023-02-15 PROCEDURE — 99999 PR PBB SHADOW E&M-EST. PATIENT-LVL IV: CPT | Mod: PBBFAC,,, | Performed by: NURSE PRACTITIONER

## 2023-02-15 PROCEDURE — 3288F FALL RISK ASSESSMENT DOCD: CPT | Mod: CPTII,S$GLB,, | Performed by: NURSE PRACTITIONER

## 2023-02-15 PROCEDURE — 71046 X-RAY EXAM CHEST 2 VIEWS: CPT | Mod: 26,,, | Performed by: RADIOLOGY

## 2023-02-15 PROCEDURE — 99214 OFFICE O/P EST MOD 30 MIN: CPT | Mod: 25,S$GLB,, | Performed by: NURSE PRACTITIONER

## 2023-02-15 PROCEDURE — 1160F PR REVIEW ALL MEDS BY PRESCRIBER/CLIN PHARMACIST DOCUMENTED: ICD-10-PCS | Mod: CPTII,S$GLB,, | Performed by: NURSE PRACTITIONER

## 2023-02-15 PROCEDURE — 3079F PR MOST RECENT DIASTOLIC BLOOD PRESSURE 80-89 MM HG: ICD-10-PCS | Mod: CPTII,S$GLB,, | Performed by: NURSE PRACTITIONER

## 2023-02-15 PROCEDURE — 3008F PR BODY MASS INDEX (BMI) DOCUMENTED: ICD-10-PCS | Mod: CPTII,S$GLB,, | Performed by: NURSE PRACTITIONER

## 2023-02-15 PROCEDURE — 1101F PT FALLS ASSESS-DOCD LE1/YR: CPT | Mod: CPTII,S$GLB,, | Performed by: NURSE PRACTITIONER

## 2023-02-15 PROCEDURE — 71046 XR CHEST PA AND LATERAL: ICD-10-PCS | Mod: 26,,, | Performed by: RADIOLOGY

## 2023-02-15 PROCEDURE — 95012 PR NITRIC OXIDE EXPIRED GAS DETERMINATION: ICD-10-PCS | Mod: S$GLB,,, | Performed by: INTERNAL MEDICINE

## 2023-02-15 PROCEDURE — 3074F SYST BP LT 130 MM HG: CPT | Mod: CPTII,S$GLB,, | Performed by: NURSE PRACTITIONER

## 2023-02-15 PROCEDURE — 4010F PR ACE/ARB THEARPY RXD/TAKEN: ICD-10-PCS | Mod: CPTII,S$GLB,, | Performed by: NURSE PRACTITIONER

## 2023-02-15 PROCEDURE — 1159F MED LIST DOCD IN RCRD: CPT | Mod: CPTII,S$GLB,, | Performed by: NURSE PRACTITIONER

## 2023-02-15 PROCEDURE — 95012 NITRIC OXIDE EXP GAS DETER: CPT | Mod: S$GLB,,, | Performed by: INTERNAL MEDICINE

## 2023-02-15 PROCEDURE — 99999 PR PBB SHADOW E&M-EST. PATIENT-LVL IV: ICD-10-PCS | Mod: PBBFAC,,, | Performed by: NURSE PRACTITIONER

## 2023-02-15 PROCEDURE — 3074F PR MOST RECENT SYSTOLIC BLOOD PRESSURE < 130 MM HG: ICD-10-PCS | Mod: CPTII,S$GLB,, | Performed by: NURSE PRACTITIONER

## 2023-02-15 PROCEDURE — 3288F PR FALLS RISK ASSESSMENT DOCUMENTED: ICD-10-PCS | Mod: CPTII,S$GLB,, | Performed by: NURSE PRACTITIONER

## 2023-02-15 PROCEDURE — 71046 X-RAY EXAM CHEST 2 VIEWS: CPT | Mod: TC

## 2023-02-15 PROCEDURE — 99214 PR OFFICE/OUTPT VISIT, EST, LEVL IV, 30-39 MIN: ICD-10-PCS | Mod: 25,S$GLB,, | Performed by: NURSE PRACTITIONER

## 2023-02-15 PROCEDURE — 4010F ACE/ARB THERAPY RXD/TAKEN: CPT | Mod: CPTII,S$GLB,, | Performed by: NURSE PRACTITIONER

## 2023-02-15 PROCEDURE — 1159F PR MEDICATION LIST DOCUMENTED IN MEDICAL RECORD: ICD-10-PCS | Mod: CPTII,S$GLB,, | Performed by: NURSE PRACTITIONER

## 2023-02-15 RX ORDER — BUDESONIDE AND FORMOTEROL FUMARATE DIHYDRATE 80; 4.5 UG/1; UG/1
2 AEROSOL RESPIRATORY (INHALATION) 2 TIMES DAILY
Qty: 10.2 G | Refills: 11 | Status: SHIPPED | OUTPATIENT
Start: 2023-02-15 | End: 2024-02-15

## 2023-02-15 NOTE — PROGRESS NOTES
Subjective:      Patient ID: Dottie Ramos is a 72 y.o. female.    Chief Complaint: Wheezing, Shortness of Breath, and Cough    HPI  Presents for SOB. SOB with exertion. Worse in the evening. No change in symptoms with albuterol. Not taking arnuity. Hx normal spirometry.   Has longstanding hx of cough. No significant cough at this time. She states wheezing starts in the evening time.   Increased GERD- taking protonix with recent worsening symptoms. Fullness in chest. Belching.   She had appointment with cardiology but canceled- needs to reschedule. Hx of CAD    Patient Active Problem List   Diagnosis    Coronary artery disease    Hyperlipidemia    Hypertension    Old MI (myocardial infarction)    S/P PTCA (percutaneous transluminal coronary angioplasty)    Coronary atherosclerosis of native coronary artery     /82   Pulse 75   Resp 20   Ht 5' (1.524 m)   Wt 76.6 kg (168 lb 14 oz)   SpO2 98%   BMI 32.98 kg/m²   Body mass index is 32.98 kg/m².    Review of Systems   Respiratory:  Positive for orthopnea and dyspnea on extertion.    Gastrointestinal:  Positive for acid reflux.   Psychiatric/Behavioral:  Positive for sleep disturbance.    All other systems reviewed and are negative.  Objective:      Physical Exam  Constitutional:       Appearance: She is well-developed.   HENT:      Head: Normocephalic and atraumatic.      Nose: Nose normal.   Cardiovascular:      Rate and Rhythm: Normal rate and regular rhythm.      Heart sounds: No murmur heard.    No gallop.   Pulmonary:      Effort: Pulmonary effort is normal.      Breath sounds: Normal breath sounds.   Abdominal:      Palpations: Abdomen is soft. There is no mass.      Tenderness: There is no abdominal tenderness.   Musculoskeletal:         General: Normal range of motion.      Cervical back: Normal range of motion and neck supple.   Skin:     General: Skin is warm and dry.   Neurological:      Mental Status: She is alert and oriented to person, place,  and time.   Psychiatric:         Mood and Affect: Mood normal.         Behavior: Behavior normal.     Personal Diagnostic Review  Interpretation of FeNO measurement in adults: 25  FENO is less than 25 ppb implies non eosinophilic airway inflammation or the absence of airway inflammation.   Comment: Low FENO (<25 ppb) in adult asthmatics with persistent symptoms suggests other etiologies for these symptoms and a lower likelihood of benefit from adding or increasing inhaled glucocorticoids.   FENO between 25 and 50 ppb in adults should be interpreted cautiously with reference to the clinical situation (eg, symptomatic, on or off therapy, current smoking).       Results for orders placed or performed in visit on 02/02/22   Spirometry with/without bronchodilator   Result Value Ref Range    Interpretation Spirometry is normal.   Â   Notes:  Â        Pre FVC 2.09 L    Pre FEV1 1.60 L    Pre FEV1 FVC 76.58 %    Pre FEF 25 75 1.39 L/s    Pre PEF 3.93 L/s    Pre  7.94 sec    FVC Ref 2.36     FVC LLN 1.70     FVC Pre Ref 88.6 %    FEV1 Ref 1.84     FEV1 LLN 1.32     FEV1 Pre Ref 87.2 %    FEV1 FVC Ref 78     FEV1 FVC LLN 65     FEV1 FVC Pre Ref 97.8 %    FEF 25 75 Ref 1.61     FEF 25 75 LLN 0.72     FEF 25 75 Pre Ref 86.7 %    PEF Ref 4.79     PEF LLN 3.30     PEF Pre Ref 82.0 %       Results for orders placed during the hospital encounter of 02/15/23    X-Ray Chest PA And Lateral    Narrative  EXAMINATION:  XR CHEST PA AND LATERAL    CLINICAL HISTORY:  Shortness of breath    TECHNIQUE:  PA and lateral views of the chest were performed.    COMPARISON:  11/11/2021    FINDINGS:  The cardiac and mediastinal silhouettes appear within normal limits.   The lungs are clear bilaterally.  No acute osseous findings demonstrated.    Impression  No acute findings.      Electronically signed by: Yuan Rodriguez MD  Date:    02/15/2023  Time:    09:56        Assessment:       1. Cough, unspecified type    2. Reactive airway disease  without complication, unspecified asthma severity, unspecified whether persistent    3. SOB (shortness of breath)          Outpatient Encounter Medications as of 2/15/2023   Medication Sig Dispense Refill    albuterol (PROVENTIL/VENTOLIN HFA) 90 mcg/actuation inhaler Inhale into the lungs.      aspirin (ECOTRIN) 81 MG EC tablet Take 81 mg by mouth once daily.      benzonatate (TESSALON) 200 MG capsule Take 1 capsule by mouth three times daily as needed for cough 45 capsule 3    busPIRone (BUSPAR) 10 MG tablet Take 10 mg by mouth 3 (three) times daily.      cephALEXin (KEFLEX) 500 MG capsule Take 500 mg by mouth 2 (two) times daily.      citalopram (CELEXA) 20 MG tablet Take 20 mg by mouth once daily.      clopidogrel (PLAVIX) 75 mg tablet Take 75 mg by mouth once daily.      dicyclomine (BENTYL) 10 MG capsule Take 10 mg by mouth 3 (three) times daily.      doxepin (SINEQUAN) 100 MG capsule TAKE ONE CAPSULE BY MOUTH AT BEDTIME 1 AND 1/2 HOURS PRIOR TO SLEEP      escitalopram oxalate (LEXAPRO) 10 MG tablet Take 10 mg by mouth once daily.      losartan (COZAAR) 50 MG tablet Take 50 mg by mouth once daily.      metoprolol tartrate (LOPRESSOR) 25 MG tablet Take 25 mg by mouth 2 (two) times daily.      pantoprazole (PROTONIX) 40 MG tablet Take 40 mg by mouth 2 (two) times daily.      phenazopyridine (PYRIDIUM) 200 MG tablet Take 200 mg by mouth 3 (three) times daily as needed for Pain.      simvastatin (ZOCOR) 80 MG tablet Take 80 mg by mouth every evening.      zolpidem (AMBIEN) 5 MG Tab Take 5 mg by mouth nightly as needed.      [DISCONTINUED] fluticasone furoate (ARNUITY ELLIPTA) 100 mcg/actuation inhaler Inhale 1 puff into the lungs once daily. 30 each 11    budesonide-formoterol 80-4.5 mcg (SYMBICORT) 80-4.5 mcg/actuation HFAA Inhale 2 puffs into the lungs 2 (two) times a day. Controller 10.2 g 11    eszopiclone (LUNESTA) 3 mg Tab Take 3 mg by mouth nightly as needed.      nitroGLYCERIN (NITROSTAT) 0.4 MG SL tablet  Place 1 tablet (0.4 mg total) under the tongue every 5 (five) minutes as needed for Chest pain. 60 tablet 12    vitamin D (VITAMIN D3) 1000 units Tab Take 2,000 Units by mouth once daily.       No facility-administered encounter medications on file as of 2/15/2023.     Orders Placed This Encounter   Procedures    Fraction of  Nitric Oxide     Standing Status:   Future     Number of Occurrences:   1     Standing Expiration Date:   2/15/2024     Order Specific Question:   Release to patient     Answer:   Immediate     Plan:        Problem List Items Addressed This Visit    None  Visit Diagnoses       Cough, unspecified type    -  Primary    Reactive airway disease without complication, unspecified asthma severity, unspecified whether persistent        SOB (shortness of breath)        Relevant Medications    budesonide-formoterol 80-4.5 mcg (SYMBICORT) 80-4.5 mcg/actuation HFAA    Other Relevant Orders    Fraction of  Nitric Oxide          Follow up GI  Reschedule with cardiology

## 2023-04-12 ENCOUNTER — OFFICE VISIT (OUTPATIENT)
Dept: CARDIOLOGY | Facility: CLINIC | Age: 73
End: 2023-04-12
Payer: MEDICARE

## 2023-04-12 ENCOUNTER — TELEPHONE (OUTPATIENT)
Dept: CARDIOLOGY | Facility: HOSPITAL | Age: 73
End: 2023-04-12
Payer: MEDICARE

## 2023-04-12 ENCOUNTER — HOSPITAL ENCOUNTER (OUTPATIENT)
Dept: CARDIOLOGY | Facility: HOSPITAL | Age: 73
Discharge: HOME | End: 2023-04-12
Attending: INTERNAL MEDICINE
Payer: MEDICARE

## 2023-04-12 VITALS
BODY MASS INDEX: 33.24 KG/M2 | HEIGHT: 60 IN | OXYGEN SATURATION: 96 % | WEIGHT: 169.31 LBS | DIASTOLIC BLOOD PRESSURE: 110 MMHG | HEART RATE: 68 BPM | SYSTOLIC BLOOD PRESSURE: 166 MMHG

## 2023-04-12 VITALS — WEIGHT: 169.31 LBS | BODY MASS INDEX: 33.07 KG/M2

## 2023-04-12 DIAGNOSIS — I10 HYPERTENSION, UNSPECIFIED TYPE: ICD-10-CM

## 2023-04-12 DIAGNOSIS — I25.2 OLD MI (MYOCARDIAL INFARCTION): ICD-10-CM

## 2023-04-12 DIAGNOSIS — E78.5 HYPERLIPIDEMIA, UNSPECIFIED HYPERLIPIDEMIA TYPE: ICD-10-CM

## 2023-04-12 DIAGNOSIS — Z98.61 S/P PTCA (PERCUTANEOUS TRANSLUMINAL CORONARY ANGIOPLASTY): ICD-10-CM

## 2023-04-12 DIAGNOSIS — I25.10 CORONARY ARTERY DISEASE, UNSPECIFIED VESSEL OR LESION TYPE, UNSPECIFIED WHETHER ANGINA PRESENT, UNSPECIFIED WHETHER NATIVE OR TRANSPLANTED HEART: Primary | ICD-10-CM

## 2023-04-12 DIAGNOSIS — R06.02 SHORTNESS OF BREATH: ICD-10-CM

## 2023-04-12 PROCEDURE — 1159F PR MEDICATION LIST DOCUMENTED IN MEDICAL RECORD: ICD-10-PCS | Mod: CPTII,S$GLB,, | Performed by: INTERNAL MEDICINE

## 2023-04-12 PROCEDURE — 1159F MED LIST DOCD IN RCRD: CPT | Mod: CPTII,S$GLB,, | Performed by: INTERNAL MEDICINE

## 2023-04-12 PROCEDURE — 93010 EKG 12-LEAD: ICD-10-PCS | Mod: ,,, | Performed by: INTERNAL MEDICINE

## 2023-04-12 PROCEDURE — 93005 ELECTROCARDIOGRAM TRACING: CPT

## 2023-04-12 PROCEDURE — 3077F PR MOST RECENT SYSTOLIC BLOOD PRESSURE >= 140 MM HG: ICD-10-PCS | Mod: CPTII,S$GLB,, | Performed by: INTERNAL MEDICINE

## 2023-04-12 PROCEDURE — 3080F DIAST BP >= 90 MM HG: CPT | Mod: CPTII,S$GLB,, | Performed by: INTERNAL MEDICINE

## 2023-04-12 PROCEDURE — 99999 PR PBB SHADOW E&M-EST. PATIENT-LVL V: CPT | Mod: PBBFAC,,, | Performed by: INTERNAL MEDICINE

## 2023-04-12 PROCEDURE — 99204 PR OFFICE/OUTPT VISIT, NEW, LEVL IV, 45-59 MIN: ICD-10-PCS | Mod: S$GLB,,, | Performed by: INTERNAL MEDICINE

## 2023-04-12 PROCEDURE — 3008F BODY MASS INDEX DOCD: CPT | Mod: CPTII,S$GLB,, | Performed by: INTERNAL MEDICINE

## 2023-04-12 PROCEDURE — 1126F AMNT PAIN NOTED NONE PRSNT: CPT | Mod: CPTII,S$GLB,, | Performed by: INTERNAL MEDICINE

## 2023-04-12 PROCEDURE — 4010F ACE/ARB THERAPY RXD/TAKEN: CPT | Mod: CPTII,S$GLB,, | Performed by: INTERNAL MEDICINE

## 2023-04-12 PROCEDURE — 99999 PR PBB SHADOW E&M-EST. PATIENT-LVL V: ICD-10-PCS | Mod: PBBFAC,,, | Performed by: INTERNAL MEDICINE

## 2023-04-12 PROCEDURE — 1101F PT FALLS ASSESS-DOCD LE1/YR: CPT | Mod: CPTII,S$GLB,, | Performed by: INTERNAL MEDICINE

## 2023-04-12 PROCEDURE — 3288F FALL RISK ASSESSMENT DOCD: CPT | Mod: CPTII,S$GLB,, | Performed by: INTERNAL MEDICINE

## 2023-04-12 PROCEDURE — 3077F SYST BP >= 140 MM HG: CPT | Mod: CPTII,S$GLB,, | Performed by: INTERNAL MEDICINE

## 2023-04-12 PROCEDURE — 93010 ELECTROCARDIOGRAM REPORT: CPT | Mod: ,,, | Performed by: INTERNAL MEDICINE

## 2023-04-12 PROCEDURE — 1126F PR PAIN SEVERITY QUANTIFIED, NO PAIN PRESENT: ICD-10-PCS | Mod: CPTII,S$GLB,, | Performed by: INTERNAL MEDICINE

## 2023-04-12 PROCEDURE — 1101F PR PT FALLS ASSESS DOC 0-1 FALLS W/OUT INJ PAST YR: ICD-10-PCS | Mod: CPTII,S$GLB,, | Performed by: INTERNAL MEDICINE

## 2023-04-12 PROCEDURE — 99204 OFFICE O/P NEW MOD 45 MIN: CPT | Mod: S$GLB,,, | Performed by: INTERNAL MEDICINE

## 2023-04-12 PROCEDURE — 3080F PR MOST RECENT DIASTOLIC BLOOD PRESSURE >= 90 MM HG: ICD-10-PCS | Mod: CPTII,S$GLB,, | Performed by: INTERNAL MEDICINE

## 2023-04-12 PROCEDURE — 3288F PR FALLS RISK ASSESSMENT DOCUMENTED: ICD-10-PCS | Mod: CPTII,S$GLB,, | Performed by: INTERNAL MEDICINE

## 2023-04-12 PROCEDURE — 3008F PR BODY MASS INDEX (BMI) DOCUMENTED: ICD-10-PCS | Mod: CPTII,S$GLB,, | Performed by: INTERNAL MEDICINE

## 2023-04-12 PROCEDURE — 4010F PR ACE/ARB THEARPY RXD/TAKEN: ICD-10-PCS | Mod: CPTII,S$GLB,, | Performed by: INTERNAL MEDICINE

## 2023-04-12 RX ORDER — AMLODIPINE BESYLATE 5 MG/1
5 TABLET ORAL NIGHTLY
Qty: 30 TABLET | Refills: 11 | Status: SHIPPED | OUTPATIENT
Start: 2023-04-12 | End: 2024-04-11

## 2023-04-12 NOTE — PROGRESS NOTES
Subjective:   Patient ID:  Dottie Ramos is a 72 y.o. female who presents for follow up of No chief complaint on file.      HPI  2020  A 70 yo female with cad htn hlp old mi s/p lad diag stenting is here for f/u .she ahs no new complaints she ahs not been exercising claims compliance with meds and diet.has tightness in throat and shortness of breath is shee hurries up.she has been  Getting palpitation that are twice weekly not lasting except for few seconds.        Has not been in clinic for 3 years she is a new patient again. Has headaches in morning she is short of breath with exertion and chest pressure. Gained weight . Has been compliant with diet and  meds.her bp is elevated. Her losartan has been adjusted to 100 mg losartan . Her bp is not well controlled. Has been feeling dizzy. Ekg uncahnged. No recent lipids to review.  Past Medical History:   Diagnosis Date    Coronary artery disease     Hyperlipidemia     Hypertension     Old MI (myocardial infarction)     S/P PTCA (percutaneous transluminal coronary angioplasty)        Past Surgical History:   Procedure Laterality Date    CORONARY ANGIOPLASTY         Social History     Tobacco Use    Smoking status: Former     Packs/day: 1.00     Years: 30.00     Pack years: 30.00     Types: Cigarettes     Quit date: 2004     Years since quittin.2    Smokeless tobacco: Never   Substance Use Topics    Alcohol use: No    Drug use: No       Family History   Problem Relation Age of Onset    Heart attack Father     Heart attack Brother     No Known Problems Mother     No Known Problems Sister     No Known Problems Maternal Aunt     No Known Problems Maternal Uncle     No Known Problems Paternal Aunt     No Known Problems Paternal Uncle     No Known Problems Maternal Grandmother     No Known Problems Maternal Grandfather     No Known Problems Paternal Grandmother     No Known Problems Paternal Grandfather     Asthma Neg Hx     Cancer Neg Hx      Diabetes Neg Hx     Emphysema Neg Hx     Heart failure Neg Hx     Hypertension Neg Hx     Asbestos Neg Hx        Current Outpatient Medications   Medication Sig    albuterol (PROVENTIL/VENTOLIN HFA) 90 mcg/actuation inhaler Inhale into the lungs.    aspirin (ECOTRIN) 81 MG EC tablet Take 81 mg by mouth once daily.    benzonatate (TESSALON) 200 MG capsule Take 1 capsule by mouth three times daily as needed for cough    budesonide-formoterol 80-4.5 mcg (SYMBICORT) 80-4.5 mcg/actuation HFAA Inhale 2 puffs into the lungs 2 (two) times a day. Controller    busPIRone (BUSPAR) 10 MG tablet Take 10 mg by mouth 3 (three) times daily.    cephALEXin (KEFLEX) 500 MG capsule Take 500 mg by mouth 2 (two) times daily.    citalopram (CELEXA) 20 MG tablet Take 20 mg by mouth once daily.    clopidogrel (PLAVIX) 75 mg tablet Take 75 mg by mouth once daily.    dicyclomine (BENTYL) 10 MG capsule Take 10 mg by mouth 3 (three) times daily.    doxepin (SINEQUAN) 100 MG capsule TAKE ONE CAPSULE BY MOUTH AT BEDTIME 1 AND 1/2 HOURS PRIOR TO SLEEP    escitalopram oxalate (LEXAPRO) 10 MG tablet Take 10 mg by mouth once daily.    eszopiclone (LUNESTA) 3 mg Tab Take 3 mg by mouth nightly as needed.    losartan (COZAAR) 50 MG tablet Take 100 mg by mouth once daily.    metoprolol tartrate (LOPRESSOR) 25 MG tablet Take 25 mg by mouth 2 (two) times daily.    nitroGLYCERIN (NITROSTAT) 0.4 MG SL tablet Place 1 tablet (0.4 mg total) under the tongue every 5 (five) minutes as needed for Chest pain.    pantoprazole (PROTONIX) 40 MG tablet Take 40 mg by mouth 2 (two) times daily.    phenazopyridine (PYRIDIUM) 200 MG tablet Take 200 mg by mouth 3 (three) times daily as needed for Pain.    simvastatin (ZOCOR) 80 MG tablet Take 80 mg by mouth every evening.    vitamin D (VITAMIN D3) 1000 units Tab Take 2,000 Units by mouth once daily.    zolpidem (AMBIEN) 5 MG Tab Take 5 mg by mouth nightly as needed.     No current facility-administered medications for this  visit.     Current Outpatient Medications on File Prior to Visit   Medication Sig    albuterol (PROVENTIL/VENTOLIN HFA) 90 mcg/actuation inhaler Inhale into the lungs.    aspirin (ECOTRIN) 81 MG EC tablet Take 81 mg by mouth once daily.    benzonatate (TESSALON) 200 MG capsule Take 1 capsule by mouth three times daily as needed for cough    budesonide-formoterol 80-4.5 mcg (SYMBICORT) 80-4.5 mcg/actuation HFAA Inhale 2 puffs into the lungs 2 (two) times a day. Controller    busPIRone (BUSPAR) 10 MG tablet Take 10 mg by mouth 3 (three) times daily.    cephALEXin (KEFLEX) 500 MG capsule Take 500 mg by mouth 2 (two) times daily.    citalopram (CELEXA) 20 MG tablet Take 20 mg by mouth once daily.    clopidogrel (PLAVIX) 75 mg tablet Take 75 mg by mouth once daily.    dicyclomine (BENTYL) 10 MG capsule Take 10 mg by mouth 3 (three) times daily.    doxepin (SINEQUAN) 100 MG capsule TAKE ONE CAPSULE BY MOUTH AT BEDTIME 1 AND 1/2 HOURS PRIOR TO SLEEP    escitalopram oxalate (LEXAPRO) 10 MG tablet Take 10 mg by mouth once daily.    eszopiclone (LUNESTA) 3 mg Tab Take 3 mg by mouth nightly as needed.    losartan (COZAAR) 50 MG tablet Take 100 mg by mouth once daily.    metoprolol tartrate (LOPRESSOR) 25 MG tablet Take 25 mg by mouth 2 (two) times daily.    nitroGLYCERIN (NITROSTAT) 0.4 MG SL tablet Place 1 tablet (0.4 mg total) under the tongue every 5 (five) minutes as needed for Chest pain.    pantoprazole (PROTONIX) 40 MG tablet Take 40 mg by mouth 2 (two) times daily.    phenazopyridine (PYRIDIUM) 200 MG tablet Take 200 mg by mouth 3 (three) times daily as needed for Pain.    simvastatin (ZOCOR) 80 MG tablet Take 80 mg by mouth every evening.    vitamin D (VITAMIN D3) 1000 units Tab Take 2,000 Units by mouth once daily.    zolpidem (AMBIEN) 5 MG Tab Take 5 mg by mouth nightly as needed.     No current facility-administered medications on file prior to visit.     Review of patient's allergies indicates:   Allergen  Reactions    Macrodantin [nitrofurantoin macrocrystalline] Swelling     Pt reports occurrence of tongue swelling    Bactrim [sulfamethoxazole-trimethoprim] Rash    Pcn [penicillins] Rash      Review of Systems   Constitutional: Positive for weight gain. Negative for diaphoresis and malaise/fatigue.   HENT:  Negative for hoarse voice.    Eyes:  Negative for double vision and visual disturbance.   Cardiovascular:  Positive for chest pain and dyspnea on exertion. Negative for claudication, cyanosis, irregular heartbeat, leg swelling, near-syncope, orthopnea, palpitations, paroxysmal nocturnal dyspnea and syncope.   Respiratory:  Positive for shortness of breath. Negative for cough, hemoptysis and snoring.    Hematologic/Lymphatic: Negative for bleeding problem. Does not bruise/bleed easily.   Skin:  Negative for color change and poor wound healing.   Musculoskeletal:  Negative for muscle cramps, muscle weakness and myalgias.   Gastrointestinal:  Negative for bloating, abdominal pain, change in bowel habit, diarrhea, heartburn, hematemesis, hematochezia, melena and nausea.   Neurological:  Positive for dizziness and light-headedness. Negative for excessive daytime sleepiness, headaches, loss of balance, numbness and weakness.   Psychiatric/Behavioral:  Negative for memory loss. The patient does not have insomnia.    Allergic/Immunologic: Negative for hives.     Objective:   Physical Exam  Constitutional:       General: She is not in acute distress.     Appearance: Normal appearance. She is well-developed. She is obese. She is not ill-appearing.   HENT:      Head: Normocephalic and atraumatic.   Eyes:      General: No scleral icterus.     Pupils: Pupils are equal, round, and reactive to light.   Neck:      Thyroid: No thyromegaly.      Vascular: Normal carotid pulses. No carotid bruit, hepatojugular reflux or JVD.      Trachea: No tracheal deviation.   Cardiovascular:      Rate and Rhythm: Normal rate and regular rhythm.       Pulses: Normal pulses.      Heart sounds: Normal heart sounds. No murmur heard.    No friction rub. No gallop.   Pulmonary:      Effort: Pulmonary effort is normal. No respiratory distress.      Breath sounds: Normal breath sounds. No wheezing, rhonchi or rales.   Chest:      Chest wall: No tenderness.   Abdominal:      General: Bowel sounds are normal. There is no abdominal bruit.      Palpations: Abdomen is soft. There is no hepatomegaly or pulsatile mass.      Tenderness: There is no abdominal tenderness.   Musculoskeletal:      Right shoulder: No deformity.      Cervical back: Normal range of motion and neck supple.      Right lower leg: No edema.      Left lower leg: No edema.   Skin:     General: Skin is warm and dry.      Findings: No erythema or rash.      Nails: There is no clubbing.   Neurological:      Mental Status: She is alert and oriented to person, place, and time.      Cranial Nerves: No cranial nerve deficit.      Coordination: Coordination normal.   Psychiatric:         Speech: Speech normal.         Behavior: Behavior normal.     Vitals:    04/12/23 1404 04/12/23 1405 04/12/23 1421 04/12/23 1422   BP: (!) 170/100 (!) 162/110 (!) 152/102 (!) 166/110   BP Location: Left arm Right arm Left arm Right arm   Patient Position: Sitting Sitting Sitting Sitting   BP Method: Medium (Manual) Medium (Manual) Medium (Manual) Medium (Manual)   Pulse: 68      SpO2: 96%      Weight: 76.8 kg (169 lb 5 oz)      Height: 5' (1.524 m)        Lab Results   Component Value Date    CHOL 148 02/28/2020    CHOL 153 02/07/2020    CHOL 152 07/31/2019      Body mass index is 33.07 kg/m².   Lab Results   Component Value Date    HGBA1C 5.4 06/13/2009      BMP  Lab Results   Component Value Date     02/28/2020    K 4.3 02/28/2020     02/28/2020    CO2 30 (H) 02/28/2020    BUN 15 02/28/2020    CREATININE 0.8 02/28/2020    CALCIUM 10.6 (H) 02/28/2020    ANIONGAP 8 02/28/2020      Lab Results   Component Value Date     HDL 58 02/28/2020    HDL 57 02/07/2020    HDL 58 07/31/2019     Lab Results   Component Value Date    LDLCALC 69.4 02/28/2020    LDLCALC 75.0 02/07/2020    LDLCALC 79.2 07/31/2019     Lab Results   Component Value Date    TRIG 103 02/28/2020    TRIG 105 02/07/2020    TRIG 74 07/31/2019     Lab Results   Component Value Date    CHOLHDL 39.2 02/28/2020    CHOLHDL 37.3 02/07/2020    CHOLHDL 38.2 07/31/2019       Chemistry        Component Value Date/Time     02/28/2020 0748    K 4.3 02/28/2020 0748     02/28/2020 0748    CO2 30 (H) 02/28/2020 0748    BUN 15 02/28/2020 0748    CREATININE 0.8 02/28/2020 0748    GLU 90 02/28/2020 0748        Component Value Date/Time    CALCIUM 10.6 (H) 02/28/2020 0748    ALKPHOS 53 (L) 02/28/2020 0748    AST 24 02/28/2020 0748    ALT 21 02/28/2020 0748    BILITOT 0.5 02/28/2020 0748    ESTGFRAFRICA >60.0 02/28/2020 0748    EGFRNONAA >60.0 02/28/2020 0748          Lab Results   Component Value Date    TSH 0.944 03/02/2015     Lab Results   Component Value Date    INR 1.0 01/12/2005     Lab Results   Component Value Date    WBC 4.89 03/02/2015    HGB 14.3 03/02/2015    HCT 41.7 03/02/2015    MCV 92 03/02/2015     03/02/2015     BMP  Sodium   Date Value Ref Range Status   02/28/2020 142 136 - 145 mmol/L Final     Potassium   Date Value Ref Range Status   02/28/2020 4.3 3.5 - 5.1 mmol/L Final     Chloride   Date Value Ref Range Status   02/28/2020 104 95 - 110 mmol/L Final     CO2   Date Value Ref Range Status   02/28/2020 30 (H) 23 - 29 mmol/L Final     BUN   Date Value Ref Range Status   02/28/2020 15 8 - 23 mg/dL Final     Creatinine   Date Value Ref Range Status   02/28/2020 0.8 0.5 - 1.4 mg/dL Final     Calcium   Date Value Ref Range Status   02/28/2020 10.6 (H) 8.7 - 10.5 mg/dL Final     Anion Gap   Date Value Ref Range Status   02/28/2020 8 8 - 16 mmol/L Final     eGFR if    Date Value Ref Range Status   02/28/2020 >60.0 >60 mL/min/1.73 m^2 Final      eGFR if non    Date Value Ref Range Status   02/28/2020 >60.0 >60 mL/min/1.73 m^2 Final     Comment:     Calculation used to obtain the estimated glomerular filtration  rate (eGFR) is the CKD-EPI equation.        CrCl cannot be calculated (Patient's most recent lab result is older than the maximum 7 days allowed.).    Assessment:     1. Coronary artery disease, unspecified vessel or lesion type, unspecified whether angina present, unspecified whether native or transplanted heart    2. Hyperlipidemia, unspecified hyperlipidemia type    3. Hypertension, unspecified type    4. Old MI (myocardial infarction)    5. S/P PTCA (percutaneous transluminal coronary angioplasty)    I think she has symptomatic uncontrolled htn will need meds adjustment in addition to low salt diet will add amlodipine 5 mg po every evening  Her symptoms although may be htn related will need evaluation for ischemia with echo cardiolite.   Discussed weight loss and compliance with diet.    Plan:   Amlodipine 5 mg po daily    Echo    Cadiolite    Low salt diet   Weight loss   F/u in 1 month

## 2023-05-04 ENCOUNTER — HOSPITAL ENCOUNTER (OUTPATIENT)
Dept: CARDIOLOGY | Facility: HOSPITAL | Age: 73
Discharge: HOME | End: 2023-05-04
Attending: INTERNAL MEDICINE
Payer: MEDICARE

## 2023-05-04 ENCOUNTER — HOSPITAL ENCOUNTER (OUTPATIENT)
Dept: RADIOLOGY | Facility: HOSPITAL | Age: 73
Discharge: HOME | End: 2023-05-04
Attending: INTERNAL MEDICINE
Payer: MEDICARE

## 2023-05-04 VITALS — HEIGHT: 60 IN | BODY MASS INDEX: 33.18 KG/M2 | WEIGHT: 169 LBS

## 2023-05-04 DIAGNOSIS — R06.02 SHORTNESS OF BREATH: ICD-10-CM

## 2023-05-04 DIAGNOSIS — I25.10 CORONARY ARTERY DISEASE, UNSPECIFIED VESSEL OR LESION TYPE, UNSPECIFIED WHETHER ANGINA PRESENT, UNSPECIFIED WHETHER NATIVE OR TRANSPLANTED HEART: ICD-10-CM

## 2023-05-04 DIAGNOSIS — E78.5 HYPERLIPIDEMIA, UNSPECIFIED HYPERLIPIDEMIA TYPE: ICD-10-CM

## 2023-05-04 DIAGNOSIS — I25.2 OLD MI (MYOCARDIAL INFARCTION): ICD-10-CM

## 2023-05-04 LAB
ASCENDING AORTA: 3.77 CM
AV INDEX (PROSTH): 0.87
AV MEAN GRADIENT: 2 MMHG
AV PEAK GRADIENT: 3 MMHG
AV VALVE AREA: 2.88 CM2
AV VELOCITY RATIO: 0.95
BSA FOR ECHO PROCEDURE: 1.8 M2
CV ECHO LV RWT: 0.68 CM
DOP CALC AO PEAK VEL: 0.86 M/S
DOP CALC AO VTI: 22.3 CM
DOP CALC LVOT AREA: 3.3 CM2
DOP CALC LVOT DIAMETER: 2.06 CM
DOP CALC LVOT PEAK VEL: 0.82 M/S
DOP CALC LVOT STROKE VOLUME: 64.29 CM3
DOP CALC RVOT PEAK VEL: 0.49 M/S
DOP CALC RVOT VTI: 13.2 CM
DOP CALCLVOT PEAK VEL VTI: 19.3 CM
E WAVE DECELERATION TIME: 177.24 MSEC
E/A RATIO: 0.63
E/E' RATIO: 6.8 M/S
ECHO LV POSTERIOR WALL: 1.24 CM (ref 0.6–1.1)
EJECTION FRACTION: 60 %
FRACTIONAL SHORTENING: 37 % (ref 28–44)
INTERVENTRICULAR SEPTUM: 1.12 CM (ref 0.6–1.1)
IVRT: 111.32 MSEC
LA MAJOR: 3.5 CM
LA MINOR: 4.45 CM
LA WIDTH: 2.8 CM
LEFT ATRIUM SIZE: 3.76 CM
LEFT ATRIUM VOLUME INDEX MOD: 12.4 ML/M2
LEFT ATRIUM VOLUME INDEX: 20.2 ML/M2
LEFT ATRIUM VOLUME MOD: 21.66 CM3
LEFT ATRIUM VOLUME: 35.06 CM3
LEFT INTERNAL DIMENSION IN SYSTOLE: 2.31 CM (ref 2.1–4)
LEFT VENTRICLE DIASTOLIC VOLUME INDEX: 32.09 ML/M2
LEFT VENTRICLE DIASTOLIC VOLUME: 55.83 ML
LEFT VENTRICLE MASS INDEX: 81 G/M2
LEFT VENTRICLE SYSTOLIC VOLUME INDEX: 10.5 ML/M2
LEFT VENTRICLE SYSTOLIC VOLUME: 18.35 ML
LEFT VENTRICULAR INTERNAL DIMENSION IN DIASTOLE: 3.64 CM (ref 3.5–6)
LEFT VENTRICULAR MASS: 140.19 G
LV LATERAL E/E' RATIO: 6.38 M/S
LV SEPTAL E/E' RATIO: 7.29 M/S
LVOT MG: 1.54 MMHG
LVOT MV: 0.59 CM/S
MV PEAK A VEL: 0.81 M/S
MV PEAK E VEL: 0.51 M/S
MV STENOSIS PRESSURE HALF TIME: 51.4 MS
MV VALVE AREA P 1/2 METHOD: 4.28 CM2
PV MEAN GRADIENT: 0.51 MMHG
PV PEAK VELOCITY: 0.66 CM/S
RA MAJOR: 4.39 CM
RA PRESSURE: 3 MMHG
RA WIDTH: 2.69 CM
RIGHT VENTRICULAR END-DIASTOLIC DIMENSION: 2.42 CM
SINUS: 3.61 CM
STJ: 3.1 CM
TDI LATERAL: 0.08 M/S
TDI SEPTAL: 0.07 M/S
TDI: 0.08 M/S

## 2023-05-04 PROCEDURE — 93016 NUCLEAR STRESS - CARDIOLOGY INTERPRETED (CUPID ONLY): ICD-10-PCS | Mod: ,,, | Performed by: INTERNAL MEDICINE

## 2023-05-04 PROCEDURE — 93017 CV STRESS TEST TRACING ONLY: CPT

## 2023-05-04 PROCEDURE — 63600175 PHARM REV CODE 636 W HCPCS: Performed by: INTERNAL MEDICINE

## 2023-05-04 PROCEDURE — 93018 CV STRESS TEST I&R ONLY: CPT | Mod: ,,, | Performed by: INTERNAL MEDICINE

## 2023-05-04 PROCEDURE — 93306 ECHO (CUPID ONLY): ICD-10-PCS | Mod: 26,,, | Performed by: INTERNAL MEDICINE

## 2023-05-04 PROCEDURE — 93016 CV STRESS TEST SUPVJ ONLY: CPT | Mod: ,,, | Performed by: INTERNAL MEDICINE

## 2023-05-04 PROCEDURE — 78452 HT MUSCLE IMAGE SPECT MULT: CPT

## 2023-05-04 PROCEDURE — 78452 HT MUSCLE IMAGE SPECT MULT: CPT | Mod: 26,,, | Performed by: INTERNAL MEDICINE

## 2023-05-04 PROCEDURE — 93306 TTE W/DOPPLER COMPLETE: CPT

## 2023-05-04 PROCEDURE — 93306 TTE W/DOPPLER COMPLETE: CPT | Mod: 26,,, | Performed by: INTERNAL MEDICINE

## 2023-05-04 PROCEDURE — 93018 NUCLEAR STRESS - CARDIOLOGY INTERPRETED (CUPID ONLY): ICD-10-PCS | Mod: ,,, | Performed by: INTERNAL MEDICINE

## 2023-05-04 PROCEDURE — 78452 NUCLEAR STRESS - CARDIOLOGY INTERPRETED (CUPID ONLY): ICD-10-PCS | Mod: 26,,, | Performed by: INTERNAL MEDICINE

## 2023-05-04 RX ORDER — REGADENOSON 0.08 MG/ML
0.4 INJECTION, SOLUTION INTRAVENOUS ONCE
Status: COMPLETED | OUTPATIENT
Start: 2023-05-04 | End: 2023-05-04

## 2023-05-04 RX ADMIN — REGADENOSON 0.4 MG: 0.08 INJECTION, SOLUTION INTRAVENOUS at 01:05

## 2023-05-05 ENCOUNTER — TELEPHONE (OUTPATIENT)
Dept: CARDIOLOGY | Facility: CLINIC | Age: 73
End: 2023-05-05
Payer: MEDICARE

## 2023-05-05 LAB
CV STRESS BASE HR: 63 BPM
DIASTOLIC BLOOD PRESSURE: 83 MMHG
NUC REST EJECTION FRACTION: 73
NUC STRESS EJECTION FRACTION: 79 %
OHS CV CPX 85 PERCENT MAX PREDICTED HEART RATE MALE: 120
OHS CV CPX ESTIMATED METS: 1
OHS CV CPX MAX PREDICTED HEART RATE: 141
OHS CV CPX PATIENT IS FEMALE: 1
OHS CV CPX PATIENT IS MALE: 0
OHS CV CPX PEAK DIASTOLIC BLOOD PRESSURE: 73 MMHG
OHS CV CPX PEAK HEAR RATE: 103 BPM
OHS CV CPX PEAK RATE PRESSURE PRODUCT: NORMAL
OHS CV CPX PEAK SYSTOLIC BLOOD PRESSURE: 154 MMHG
OHS CV CPX PERCENT MAX PREDICTED HEART RATE ACHIEVED: 73
OHS CV CPX RATE PRESSURE PRODUCT PRESENTING: 9198
STRESS ECHO POST EXERCISE DUR MIN: 0 MINUTES
STRESS ECHO POST EXERCISE DUR SEC: 47 SECONDS
SYSTOLIC BLOOD PRESSURE: 146 MMHG

## 2023-05-05 NOTE — TELEPHONE ENCOUNTER
The patient has been notified of this information and all questions answered.      ----- Message from Marylou Pal MD sent at 5/4/2023  8:11 PM CDT -----  Heart function is normal.

## 2023-05-09 ENCOUNTER — TELEPHONE (OUTPATIENT)
Dept: CARDIOLOGY | Facility: CLINIC | Age: 73
End: 2023-05-09
Payer: MEDICARE

## 2023-05-09 NOTE — TELEPHONE ENCOUNTER
.LVM for patient to call the office regarding results.    ----- Message from Marylou Pal MD sent at 5/5/2023  5:28 PM CDT -----  STRESS TEST LOOKS GOOD

## 2023-05-10 ENCOUNTER — TELEPHONE (OUTPATIENT)
Dept: CARDIOLOGY | Facility: CLINIC | Age: 73
End: 2023-05-10
Payer: MEDICARE

## 2023-05-10 NOTE — TELEPHONE ENCOUNTER
Patient was notified of results. All questions were answered. Pt verbalized understanding. Pt will call back with any other questions or concerns.      STRESS TEST LOOKS GOOD    ----- Message from Shara [address] sent at 5/10/2023  9:47 AM CDT -----  Contact: garrett  .Type:  Patient Returning Call    Who Called:garrett   Who Left Message for Patient:nurse  Does the patient know what this is regarding?:results   Would the patient rather a call back or a response via [facility]? Call back  Best Call Back Number:.[phone]   Additional Information: patient returning call

## 2023-05-26 ENCOUNTER — TELEPHONE (OUTPATIENT)
Dept: CARDIOLOGY | Facility: CLINIC | Age: 73
End: 2023-05-26
Payer: MEDICARE

## 2023-05-26 NOTE — TELEPHONE ENCOUNTER
Spoke to patient and advised patient to hold losartan per Dr. Pal and call us on Monday to let us know how she is doing. Also let her know that there is always someone on call if she needs someone over the weekend. Patient verbalized an understanding.----- Message from Marylou Pal MD sent at 5/26/2023  3:43 PM CDT -----  Contact: self  Hold losartan and see what happens  ----- Message -----  From: Marcia Mitchell LPN  Sent: 5/26/2023   2:17 PM CDT  To: Marylou Pal MD    Patient calling saying her b/p is running between 117/65 and 100/50. She is not dizzy just feels like she had one to many drinks. She said she doesn't trust herself to drive and when she walks she has to hold onto the wall. She is currently on amlodipine 50 mg daily, losartan 50 mg she cut to once daily and metoprolol 25 mg BID.    Please advise  ----- Message -----  From: Belkys Mariee  Sent: 5/26/2023  11:11 AM CDT  To: Demarco JACKSON Staff    Pt is asking for an return call in reference to new medication she was prescribed ,pt states blood pressure has dropped tremendously, makes he feel incapable, please call back at .[phone] Thx CJ

## 2023-05-30 ENCOUNTER — TELEPHONE (OUTPATIENT)
Dept: CARDIOLOGY | Facility: CLINIC | Age: 73
End: 2023-05-30
Payer: MEDICARE

## 2023-05-30 NOTE — TELEPHONE ENCOUNTER
Patient called to check in b/p running 110/60 to 125/60. Feeling fine just wanted to check in----- Message from Latrice De León sent at 5/30/2023 10:30 AM CDT -----  Contact: Dottie Dewitt is calling in regards to her blood pressure was 100/60.Please call back at [phone]          Thanks  RIANNA

## 2023-07-07 ENCOUNTER — PATIENT MESSAGE (OUTPATIENT)
Dept: INFECTIOUS DISEASES | Facility: CLINIC | Age: 73
End: 2023-07-07
Payer: MEDICARE

## 2024-01-08 ENCOUNTER — TELEPHONE (OUTPATIENT)
Dept: CARDIOLOGY | Facility: CLINIC | Age: 74
End: 2024-01-08
Payer: MEDICARE

## 2024-01-08 NOTE — TELEPHONE ENCOUNTER
Faxed clearance and fax was successful [phone]----- Message from Marylou Pal MD sent at 1/8/2024  4:15 PM CST -----  yes  ----- Message -----  From: Marcia Mitchell LPN  Sent: 1/8/2024   4:12 PM CST  To: Marylou Pal MD    Can patient hold plavix for 7 days for Cervical, Lumbar and Thoracic SHANKAR including Inter and trans with Dr. Carmelo Cohn?  ----- Message -----  From: Artem Boucher  Sent: 1/8/2024  11:09 AM CST  To: Demarco JACKSON Staff    Type:  Patient Returning Call    Who Called:pt   Who Left Message for Patient:  Does the patient know what this is regarding?:pt advice  Would the patient rather a call back or a response via [facility]? call  Best Call Back Number: [phone]  Additional Information: pt states Dr.William Ortez from the  [facility] has been trying to get in contact with  office in regards to approval for the getting off of clopidogrel (PLAVIX) for 8 days before her procedure.

## 2024-04-09 DIAGNOSIS — R06.02 SHORTNESS OF BREATH: ICD-10-CM

## 2024-04-09 DIAGNOSIS — I25.2 OLD MI (MYOCARDIAL INFARCTION): ICD-10-CM

## 2024-04-09 DIAGNOSIS — I25.10 CORONARY ARTERY DISEASE, UNSPECIFIED VESSEL OR LESION TYPE, UNSPECIFIED WHETHER ANGINA PRESENT, UNSPECIFIED WHETHER NATIVE OR TRANSPLANTED HEART: ICD-10-CM

## 2024-04-09 DIAGNOSIS — E78.5 HYPERLIPIDEMIA, UNSPECIFIED HYPERLIPIDEMIA TYPE: ICD-10-CM

## 2024-04-09 RX ORDER — AMLODIPINE BESYLATE 5 MG/1
5 TABLET ORAL NIGHTLY
Qty: 90 TABLET | Refills: 0 | Status: SHIPPED | OUTPATIENT
Start: 2024-04-09

## 2024-07-09 DIAGNOSIS — I25.2 OLD MI (MYOCARDIAL INFARCTION): ICD-10-CM

## 2024-07-09 DIAGNOSIS — I25.10 CORONARY ARTERY DISEASE, UNSPECIFIED VESSEL OR LESION TYPE, UNSPECIFIED WHETHER ANGINA PRESENT, UNSPECIFIED WHETHER NATIVE OR TRANSPLANTED HEART: ICD-10-CM

## 2024-07-09 DIAGNOSIS — E78.5 HYPERLIPIDEMIA, UNSPECIFIED HYPERLIPIDEMIA TYPE: ICD-10-CM

## 2024-07-09 DIAGNOSIS — R06.02 SHORTNESS OF BREATH: ICD-10-CM

## 2024-07-10 RX ORDER — AMLODIPINE BESYLATE 5 MG/1
5 TABLET ORAL NIGHTLY
Qty: 90 TABLET | Refills: 3 | Status: SHIPPED | OUTPATIENT
Start: 2024-07-10

## 2024-07-12 ENCOUNTER — HOSPITAL ENCOUNTER (OUTPATIENT)
Dept: RADIOLOGY | Facility: HOSPITAL | Age: 74
Discharge: HOME | End: 2024-07-12
Attending: INTERNAL MEDICINE
Payer: MEDICARE

## 2024-07-12 ENCOUNTER — OFFICE VISIT (OUTPATIENT)
Dept: PULMONOLOGY | Facility: CLINIC | Age: 74
End: 2024-07-12
Payer: MEDICARE

## 2024-07-12 ENCOUNTER — CLINICAL SUPPORT (OUTPATIENT)
Dept: PULMONOLOGY | Facility: CLINIC | Age: 74
End: 2024-07-12
Attending: INTERNAL MEDICINE
Payer: MEDICARE

## 2024-07-12 ENCOUNTER — CLINICAL SUPPORT (OUTPATIENT)
Dept: PULMONOLOGY | Facility: CLINIC | Age: 74
End: 2024-07-12
Payer: MEDICARE

## 2024-07-12 VITALS
WEIGHT: 162.25 LBS | OXYGEN SATURATION: 98 % | RESPIRATION RATE: 19 BRPM | HEIGHT: 60 IN | DIASTOLIC BLOOD PRESSURE: 82 MMHG | BODY MASS INDEX: 31.86 KG/M2 | HEART RATE: 80 BPM | SYSTOLIC BLOOD PRESSURE: 130 MMHG

## 2024-07-12 DIAGNOSIS — J45.909 ASTHMATIC BRONCHITIS WITHOUT COMPLICATION, UNSPECIFIED ASTHMA SEVERITY, UNSPECIFIED WHETHER PERSISTENT: ICD-10-CM

## 2024-07-12 DIAGNOSIS — J44.89 ASTHMATIC BRONCHITIS , CHRONIC: ICD-10-CM

## 2024-07-12 DIAGNOSIS — E78.49 OTHER HYPERLIPIDEMIA: ICD-10-CM

## 2024-07-12 DIAGNOSIS — E83.52 SERUM CALCIUM ELEVATED: ICD-10-CM

## 2024-07-12 DIAGNOSIS — G47.00 INSOMNIA, UNSPECIFIED TYPE: ICD-10-CM

## 2024-07-12 DIAGNOSIS — I10 PRIMARY HYPERTENSION: ICD-10-CM

## 2024-07-12 DIAGNOSIS — E83.52 HYPERCALCEMIA: ICD-10-CM

## 2024-07-12 DIAGNOSIS — J45.909 ASTHMATIC BRONCHITIS WITHOUT COMPLICATION, UNSPECIFIED ASTHMA SEVERITY, UNSPECIFIED WHETHER PERSISTENT: Primary | ICD-10-CM

## 2024-07-12 LAB
ALLENS TEST: ABNORMAL
DELSYS: ABNORMAL
FIO2: 21
HCO3 UR-SCNC: 22.2 MMOL/L (ref 24–28)
MODE: ABNORMAL
PCO2 BLDA: 29.3 MMHG (ref 35–45)
PH SMN: 7.49 [PH] (ref 7.35–7.45)
PO2 BLDA: 73 MMHG (ref 80–100)
POC BE: -1 MMOL/L
POC SATURATED O2: 96 % (ref 95–100)
SAMPLE: ABNORMAL
SITE: ABNORMAL

## 2024-07-12 PROCEDURE — 71046 X-RAY EXAM CHEST 2 VIEWS: CPT | Mod: TC

## 2024-07-12 PROCEDURE — 99999 PR PBB SHADOW E&M-EST. PATIENT-LVL V: CPT | Mod: PBBFAC,,, | Performed by: INTERNAL MEDICINE

## 2024-07-12 PROCEDURE — 71046 X-RAY EXAM CHEST 2 VIEWS: CPT | Mod: 26,,, | Performed by: RADIOLOGY

## 2024-07-12 NOTE — PROGRESS NOTES
Pulmonary Outpatient  Visit     Subjective:       Patient ID: Dottie Ramos is a 73 y.o. female.    Social History     Tobacco Use   Smoking Status Former    Current packs/day: 0.00    Average packs/day: 1 pack/day for 30.0 years (30.0 ttl pk-yrs)    Types: Cigarettes    Start date: 1974    Quit date: 2004    Years since quittin.5   Smokeless Tobacco Never            Chief Complaint: Hospital Follow Up          Dottie Ramos is 73 y.o.  Last seen 02/15/2023  Here with daughter  Recently in ER [facility]  Weakness, lethargy, labile BP  Calcium was high seen recently  Says had cough, wheezing. Dry cough in ER  Was given neb treatment  No cough, wheezing today  Main issue weakness  Was told imaging chest CT abnormal however forgot CD disc and report  Insomnia  Recent PCP notes reveiwed  No fever  CXR reviewed            Review of Systems   Constitutional:  Positive for fatigue.   Psychiatric/Behavioral:  Positive for sleep disturbance.        Outpatient Encounter Medications as of 2024   Medication Sig Dispense Refill    albuterol (PROVENTIL/VENTOLIN HFA) 90 mcg/actuation inhaler Inhale into the lungs.      amLODIPine (NORVASC) 5 MG tablet TAKE 1 TABLET BY MOUTH ONCE DAILY IN THE EVENING 90 tablet 3    aspirin (ECOTRIN) 81 MG EC tablet Take 81 mg by mouth once daily.      benzonatate (TESSALON) 200 MG capsule Take 1 capsule by mouth three times daily as needed for cough 45 capsule 3    cephALEXin (KEFLEX) 500 MG capsule Take 500 mg by mouth 2 (two) times daily.      citalopram (CELEXA) 20 MG tablet Take 20 mg by mouth once daily.      clopidogrel (PLAVIX) 75 mg tablet Take 75 mg by mouth once daily.      dicyclomine (BENTYL) 10 MG capsule Take 10 mg by mouth 3 (three) times daily.      losartan (COZAAR) 50 MG tablet Take 100 mg by mouth once daily.      metoprolol tartrate (LOPRESSOR) 25 MG tablet Take 25 mg by mouth 2 (two) times daily.      pantoprazole  (PROTONIX) 40 MG tablet Take 40 mg by mouth 2 (two) times daily.      simvastatin (ZOCOR) 80 MG tablet Take 80 mg by mouth every evening.      zolpidem (AMBIEN) 5 MG Tab Take 5 mg by mouth nightly as needed.      budesonide-formoterol 80-4.5 mcg (SYMBICORT) 80-4.5 mcg/actuation HFAA Inhale 2 puffs into the lungs 2 (two) times a day. Controller 10.2 g 11    busPIRone (BUSPAR) 10 MG tablet Take 10 mg by mouth 3 (three) times daily.      doxepin (SINEQUAN) 100 MG capsule TAKE ONE CAPSULE BY MOUTH AT BEDTIME 1 AND 1/2 HOURS PRIOR TO SLEEP      escitalopram oxalate (LEXAPRO) 10 MG tablet Take 10 mg by mouth once daily. (Patient not taking: Reported on 7/12/2024)      eszopiclone (LUNESTA) 3 mg Tab Take 3 mg by mouth nightly as needed. (Patient not taking: Reported on 7/12/2024)      nitroGLYCERIN (NITROSTAT) 0.4 MG SL tablet Place 1 tablet (0.4 mg total) under the tongue every 5 (five) minutes as needed for Chest pain. 60 tablet 12    phenazopyridine (PYRIDIUM) 200 MG tablet Take 200 mg by mouth 3 (three) times daily as needed for Pain. (Patient not taking: Reported on 7/12/2024)      vitamin D (VITAMIN D3) 1000 units Tab Take 2,000 Units by mouth once daily. (Patient not taking: Reported on 7/12/2024)       No facility-administered encounter medications on file as of 7/12/2024.       The following portions of the patient's history were reviewed and updated as appropriate: She  has a past medical history of Coronary artery disease, Hyperlipidemia, Hypertension, Old MI (myocardial infarction), and S/P PTCA (percutaneous transluminal coronary angioplasty).  She does not have any pertinent problems on file.  She  has a past surgical history that includes Coronary angioplasty.  Her family history includes Heart attack in her brother and father; No Known Problems in her maternal aunt, maternal grandfather, maternal grandmother, maternal uncle, mother, paternal aunt, paternal grandfather, paternal grandmother, paternal uncle,  and sister.  She  reports that she quit smoking about 20 years ago. Her smoking use included cigarettes. She started smoking about 50 years ago. She has a 30 pack-year smoking history. She has never used smokeless tobacco. She reports that she does not drink alcohol and does not use drugs.  She has a current medication list which includes the following prescription(s): albuterol, amlodipine, aspirin, benzonatate, cephalexin, citalopram, clopidogrel, dicyclomine, losartan, metoprolol tartrate, pantoprazole, simvastatin, zolpidem, budesonide-formoterol 80-4.5 mcg, buspirone, doxepin, escitalopram oxalate, eszopiclone, nitroglycerin, phenazopyridine, and vitamin d.  Current Outpatient Medications on File Prior to Visit   Medication Sig Dispense Refill    albuterol (PROVENTIL/VENTOLIN HFA) 90 mcg/actuation inhaler Inhale into the lungs.      amLODIPine (NORVASC) 5 MG tablet TAKE 1 TABLET BY MOUTH ONCE DAILY IN THE EVENING 90 tablet 3    aspirin (ECOTRIN) 81 MG EC tablet Take 81 mg by mouth once daily.      benzonatate (TESSALON) 200 MG capsule Take 1 capsule by mouth three times daily as needed for cough 45 capsule 3    cephALEXin (KEFLEX) 500 MG capsule Take 500 mg by mouth 2 (two) times daily.      citalopram (CELEXA) 20 MG tablet Take 20 mg by mouth once daily.      clopidogrel (PLAVIX) 75 mg tablet Take 75 mg by mouth once daily.      dicyclomine (BENTYL) 10 MG capsule Take 10 mg by mouth 3 (three) times daily.      losartan (COZAAR) 50 MG tablet Take 100 mg by mouth once daily.      metoprolol tartrate (LOPRESSOR) 25 MG tablet Take 25 mg by mouth 2 (two) times daily.      pantoprazole (PROTONIX) 40 MG tablet Take 40 mg by mouth 2 (two) times daily.      simvastatin (ZOCOR) 80 MG tablet Take 80 mg by mouth every evening.      zolpidem (AMBIEN) 5 MG Tab Take 5 mg by mouth nightly as needed.      budesonide-formoterol 80-4.5 mcg (SYMBICORT) 80-4.5 mcg/actuation HFAA Inhale 2 puffs into the lungs 2 (two) times a day.  Controller 10.2 g 11    busPIRone (BUSPAR) 10 MG tablet Take 10 mg by mouth 3 (three) times daily.      doxepin (SINEQUAN) 100 MG capsule TAKE ONE CAPSULE BY MOUTH AT BEDTIME 1 AND 1/2 HOURS PRIOR TO SLEEP      escitalopram oxalate (LEXAPRO) 10 MG tablet Take 10 mg by mouth once daily. (Patient not taking: Reported on 7/12/2024)      eszopiclone (LUNESTA) 3 mg Tab Take 3 mg by mouth nightly as needed. (Patient not taking: Reported on 7/12/2024)      nitroGLYCERIN (NITROSTAT) 0.4 MG SL tablet Place 1 tablet (0.4 mg total) under the tongue every 5 (five) minutes as needed for Chest pain. 60 tablet 12    phenazopyridine (PYRIDIUM) 200 MG tablet Take 200 mg by mouth 3 (three) times daily as needed for Pain. (Patient not taking: Reported on 7/12/2024)      vitamin D (VITAMIN D3) 1000 units Tab Take 2,000 Units by mouth once daily. (Patient not taking: Reported on 7/12/2024)       No current facility-administered medications on file prior to visit.     She is allergic to macrodantin [nitrofurantoin macrocrystalline], bactrim [sulfamethoxazole-trimethoprim], and pcn [penicillins]..      BP Readings from Last 3 Encounters:   07/12/24 130/82   04/12/23 (!) 166/110   02/15/23 120/82        MMRC Dyspnea Scale (4 is worst)     [] MMRC 0: Dyspneic on strenuous excercise (0 points)    [] MMRC 1: Dyspneic on walking a slight hill (0 points)    [x] MMRC 2: Dyspneic on walking level ground; must stop occasionally due to breathlessness (1 point)    [] MMRC 3: Must stop for breathlessness after walking 100 yards or after a few minutes (2 points)    [] MMRC 4: Cannot leave house; breathless on dressing/undressing (3 points)                         2/2/2022     9:00 AM   EPWORTH SLEEPINESS SCALE   Sitting and reading 0   Watching TV 1   Sitting, inactive in a public place (e.g. a theatre or a meeting) 0   As a passenger in a car for an hour without a break 0   Lying down to rest in the afternoon when circumstances permit 0   Sitting and  talking to someone 0   Sitting quietly after a lunch without alcohol 0   In a car, while stopped for a few minutes in traffic 0   Total score 1               2/2/2022     9:00 AM   EPWORTH SLEEPINESS SCALE   Sitting and reading 0   Watching TV 1   Sitting, inactive in a public place (e.g. a theatre or a meeting) 0   As a passenger in a car for an hour without a break 0   Lying down to rest in the afternoon when circumstances permit 0   Sitting and talking to someone 0   Sitting quietly after a lunch without alcohol 0   In a car, while stopped for a few minutes in traffic 0   Total score 1            Objective:     Vital Signs (Most Recent)  Vital Signs  Pulse: 80  Resp: 19  SpO2: 98 %  BP: 130/82  Pain Score:   2  Pain Loc: Back  Height and Weight  Height: 5' (152.4 cm)  Weight: 73.6 kg (162 lb 4.1 oz)  BSA (Calculated - sq m): 1.77 sq meters  BMI (Calculated): 31.7  Weight in (lb) to have BMI = 25: 127.7]  Wt Readings from Last 2 Encounters:   07/12/24 73.6 kg (162 lb 4.1 oz)   05/04/23 76.7 kg (169 lb)       Physical Exam  Vitals and nursing note reviewed.   Constitutional:       Appearance: She is normal weight.   HENT:      Head: Normocephalic and atraumatic.      Nose: Nose normal.   Eyes:      Pupils: Pupils are equal, round, and reactive to light.   Cardiovascular:      Rate and Rhythm: Normal rate and regular rhythm.      Pulses: Normal pulses.      Heart sounds: Normal heart sounds.   Pulmonary:      Effort: Pulmonary effort is normal.      Breath sounds: Normal breath sounds.   Abdominal:      General: Bowel sounds are normal.      Palpations: Abdomen is soft.   Musculoskeletal:      Cervical back: Normal range of motion.   Skin:     General: Skin is warm.   Neurological:      General: No focal deficit present.      Mental Status: She is alert and oriented to person, place, and time.   Psychiatric:         Mood and Affect: Mood normal.          Laboratory  Lab Results   Component Value Date    WBC 4.89  "03/02/2015    RBC 4.52 03/02/2015    HGB 14.3 03/02/2015    HCT 41.7 03/02/2015    MCV 92 03/02/2015    MCH 31.6 (H) 03/02/2015    MCHC 34.3 03/02/2015    RDW 12.9 03/02/2015     03/02/2015    MPV 10.9 03/02/2015    GRAN 2.7 03/02/2015    GRAN 55.9 03/02/2015    LYMPH 1.1 03/02/2015    LYMPH 23.3 03/02/2015    MONO 0.4 03/02/2015    MONO 8.8 03/02/2015    EOS 0.5 03/02/2015    BASO 0.05 03/02/2015    EOSINOPHIL 10.8 (H) 03/02/2015    BASOPHIL 1.0 03/02/2015       BMP  Lab Results   Component Value Date     02/28/2020    K 4.3 02/28/2020     02/28/2020    CO2 30 (H) 02/28/2020    BUN 15 02/28/2020    CREATININE 0.8 02/28/2020    CALCIUM 10.4 (H) 06/07/2024    ANIONGAP 8 02/28/2020    ESTGFRAFRICA >60.0 02/28/2020    EGFRNONAA >60.0 02/28/2020    AST 24 02/28/2020    ALT 21 02/28/2020    PROT 6.3 02/28/2020          No results found for: "IGE"     No results found for: "ASPERGILLUS"  No results found for: "AFUMIGATUSCL"     No results found for: "ACE"     Diagnostic Results:  I have personally reviewed today the following studies:    X-Ray Chest PA And Lateral  Narrative: EXAMINATION:  XR CHEST PA AND LATERAL    CLINICAL HISTORY:  Unspecified asthma, uncomplicated    TECHNIQUE:  PA and lateral views of the chest were performed.    COMPARISON:  Multiple priors, most recent 02/15/2023    FINDINGS:  Bibasilar atelectasis.  Otherwise,the lungs are clear.  No focal consolidation, pleural effusion or pneumothorax.    Normal cardiomediastinal contour.    No acute or aggressive osseous abnormality.  Impression: No acute cardiopulmonary abnormality.    Electronically signed by: Lamar Trevino  Date:    07/12/2024  Time:    14:55     Recent Labs     07/12/24  1409   PH 7.487*   PCO2 29.3*   PO2 73*   HCO3 22.2*   POCSATURATED 96   BE -1      Clinical Guide to Interpretation or FeNO Levels :     FeNO  (ppb) LOW INTERMEDIATE HIGH   ADULT VALUES < 25 25-50          > 50   Th2-driven Inflammation Unlikely Likely " Significant      Patients FeNO level at this visit : __21__ (ppb)     Interpretation of FeNO measurement in adults:  [x] FENO is less than 25 ppb implies non eosinophilic airway inflammation or the absence of airway inflammation.               Comment: Low FENO (<25 ppb) in adult asthmatics with persistent symptoms suggests other etiologies for these symptoms and a lower likelihood of benefit from adding or increasing inhaled glucocorticoids.  Assessment/Plan:     Problem List Items Addressed This Visit       Hyperlipidemia    Hypertension    Asthmatic bronchitis , chronic          FeNo was 21    Recent Labs     24  1409   PH 7.487*   PCO2 29.3*   PO2 73*   HCO3 22.2*   POCSATURATED 96   BE -1        CXR was clear    On SYMBICORT         Insomnia     On AMBIEN  Recent sleep eating   Therapy decreased         Hypercalcemia     Recently seen ER [facility]    Weakness, fatigue, myalgia, asthenia    Abn CXR /chest CT from ER: needs to bring CD disc and report    Stat labs, ref to Nephrology    May need to go to ER/Hospital if critical values          Other Visit Diagnoses       Asthmatic bronchitis without complication, unspecified asthma severity, unspecified whether persistent    -  Primary    Relevant Orders    X-Ray Chest PA And Lateral (Completed)    Fraction of  Nitric Oxide (Completed)    IgE    CBC auto differential    Comprehensive Metabolic Panel    PFT - related Arterial Blood Gas (Completed)    Serum calcium elevated        Relevant Orders    Ambulatory referral/consult to Nephrology    CALCIUM, IONIZED    ANGIOTENSIN CONVERTING ENZYME    PTH-RELATED PEPTIDE    PTH, intact           Clinical symptoms suggest hypercalcemia  Repeat labs and referral made  Likely may need to go to ER if critical labs  No indication of asthma exacebation       Follow up in about 4 weeks (around 2024), or FeNo, CXR, Labs, get ABG, followup with endocrine, ref to nephrology.    This note was prepared using voice  recognition system and is likely to have sound alike errors that may have been overlooked even after proof reading.  Please call me with any questions    Discussed diagnosis, its evaluation, treatment and usual course. All questions answered.    Thank you for the courtesy of participating in the care of this patient    Alexis Kaplan MD      Personal Diagnostic Review  []  CXR    []  ECHO    []  ONSAT    []  6MWD    []  LABS    []  CHEST CT    []  PET CT    []  Biopsy results

## 2024-07-12 NOTE — ASSESSMENT & PLAN NOTE
Recently seen ER [facility]    Weakness, fatigue, myalgia, asthenia    Abn CXR /chest CT from ER: needs to bring CD disc and report    Stat labs, ref to Nephrology    May need to go to ER/Hospital if critical values

## 2024-07-12 NOTE — ASSESSMENT & PLAN NOTE
FeNo was 21    Recent Labs     07/12/24  1409   PH 7.487*   PCO2 29.3*   PO2 73*   HCO3 22.2*   POCSATURATED 96   BE -1        CXR was clear    On SYMBICORT

## 2024-07-12 NOTE — PROCEDURES
ABG completed. See ABG Below.     Latest Reference Range & Units 07/12/24 14:09   POC PH 7.35 - 7.45  7.487 (H)   POC PCO2 35 - 45 mmHg 29.3 (L)   POC PO2 80 - 100 mmHg 73 (L)   POC HCO3 24 - 28 mmol/L 22.2 (L)   POC SATURATED O2 95 - 100 % 96   Sample  ARTERIAL   POC BE -2 to 2 mmol/L -1   FiO2  21   DelSys  Room Air   Site  RR   Mode  SPONT   (H): Data is abnormally high  (L): Data is abnormally low      Interpretation:  [] Arterial blood gases on room air demonstrate normal pCO2 and pO2  [] Arterial blood gases on room air are abnormal demonstrating hypercarbia (pCO2 >45 mmHg)  [x] Arterial blood gases on room air are abnormal demonstrating hypocarbia (pCO2 < 35 mmHg)  [x] Arterial blood gases on room air are abnormal demonstrating hypoxemia (pO2 < 80 mmHg)  [] Arterial blood gases on room air are abnormal demonstrating hyperoxemia (pO2 >120 mmHg)  [] Arterial blood gases on room air are abnormal demonstrating hypoxemia (pO2 < 80 mmHg) and hypercarbia (pCO2>45 mmHg)    The table below summarizes the main interpretations of the relationship between the arterial blood gases, pH, pCO2 and HCO-3    [x]Acute respiratory alkalosis    I

## 2024-07-13 ENCOUNTER — HOSPITAL ENCOUNTER (OUTPATIENT)
Facility: HOSPITAL | Age: 74
Discharge: LEFT BEFORE DISCHARGE | End: 2024-07-13
Attending: EMERGENCY MEDICINE | Admitting: HOSPITALIST
Payer: MEDICARE

## 2024-07-13 VITALS
SYSTOLIC BLOOD PRESSURE: 144 MMHG | WEIGHT: 148.56 LBS | HEART RATE: 81 BPM | RESPIRATION RATE: 24 BRPM | BODY MASS INDEX: 29.02 KG/M2 | TEMPERATURE: 98 F | DIASTOLIC BLOOD PRESSURE: 79 MMHG | OXYGEN SATURATION: 94 %

## 2024-07-13 DIAGNOSIS — I10 CHRONIC HYPERTENSION: ICD-10-CM

## 2024-07-13 DIAGNOSIS — R07.9 CHEST PAIN: ICD-10-CM

## 2024-07-13 DIAGNOSIS — E21.3 HYPERPARATHYROIDISM: Primary | ICD-10-CM

## 2024-07-13 DIAGNOSIS — R53.1 WEAKNESS: ICD-10-CM

## 2024-07-13 LAB
ALBUMIN SERPL BCP-MCNC: 4 G/DL (ref 3.5–5.2)
ALP SERPL-CCNC: 65 U/L (ref 55–135)
ALT SERPL W/O P-5'-P-CCNC: 18 U/L (ref 10–44)
ANION GAP SERPL CALC-SCNC: 9 MMOL/L (ref 8–16)
AST SERPL-CCNC: 22 U/L (ref 10–40)
BACTERIA #/AREA URNS HPF: ABNORMAL /HPF
BASOPHILS # BLD AUTO: 0.07 K/UL (ref 0–0.2)
BASOPHILS NFR BLD: 1.3 % (ref 0–1.9)
BILIRUB SERPL-MCNC: 0.4 MG/DL (ref 0.1–1)
BILIRUB UR QL STRIP: NEGATIVE
BNP SERPL-MCNC: 66 PG/ML (ref 0–99)
BUN SERPL-MCNC: 16 MG/DL (ref 8–23)
CALCIUM SERPL-MCNC: 10.2 MG/DL (ref 8.7–10.5)
CHLORIDE SERPL-SCNC: 105 MMOL/L (ref 95–110)
CK SERPL-CCNC: 44 U/L (ref 20–180)
CLARITY UR: ABNORMAL
CO2 SERPL-SCNC: 27 MMOL/L (ref 23–29)
COLOR UR: YELLOW
CREAT SERPL-MCNC: 0.9 MG/DL (ref 0.5–1.4)
DIFFERENTIAL METHOD BLD: NORMAL
EOSINOPHIL # BLD AUTO: 0.4 K/UL (ref 0–0.5)
EOSINOPHIL NFR BLD: 6.8 % (ref 0–8)
ERYTHROCYTE [DISTWIDTH] IN BLOOD BY AUTOMATED COUNT: 12.6 % (ref 11.5–14.5)
EST. GFR  (NO RACE VARIABLE): >60 ML/MIN/1.73 M^2
GLUCOSE SERPL-MCNC: 85 MG/DL (ref 70–110)
GLUCOSE UR QL STRIP: NEGATIVE
HCT VFR BLD AUTO: 40.7 % (ref 37–48.5)
HGB BLD-MCNC: 13.7 G/DL (ref 12–16)
HGB UR QL STRIP: ABNORMAL
HYALINE CASTS #/AREA URNS LPF: 1 /LPF
IMM GRANULOCYTES # BLD AUTO: 0.02 K/UL (ref 0–0.04)
IMM GRANULOCYTES NFR BLD AUTO: 0.4 % (ref 0–0.5)
KETONES UR QL STRIP: NEGATIVE
LEUKOCYTE ESTERASE UR QL STRIP: ABNORMAL
LYMPHOCYTES # BLD AUTO: 1.3 K/UL (ref 1–4.8)
LYMPHOCYTES NFR BLD: 24 % (ref 18–48)
MAGNESIUM SERPL-MCNC: 1.7 MG/DL (ref 1.6–2.6)
MCH RBC QN AUTO: 30.7 PG (ref 27–31)
MCHC RBC AUTO-ENTMCNC: 33.7 G/DL (ref 32–36)
MCV RBC AUTO: 91 FL (ref 82–98)
MICROSCOPIC COMMENT: ABNORMAL
MONOCYTES # BLD AUTO: 0.5 K/UL (ref 0.3–1)
MONOCYTES NFR BLD: 8.4 % (ref 4–15)
NEUTROPHILS # BLD AUTO: 3.2 K/UL (ref 1.8–7.7)
NEUTROPHILS NFR BLD: 59.1 % (ref 38–73)
NITRITE UR QL STRIP: NEGATIVE
NRBC BLD-RTO: 0 /100 WBC
PH UR STRIP: 6 [PH] (ref 5–8)
PLATELET # BLD AUTO: 193 K/UL (ref 150–450)
PMV BLD AUTO: 10.4 FL (ref 9.2–12.9)
POTASSIUM SERPL-SCNC: 3.7 MMOL/L (ref 3.5–5.1)
PROT SERPL-MCNC: 6.4 G/DL (ref 6–8.4)
PROT UR QL STRIP: ABNORMAL
RBC # BLD AUTO: 4.46 M/UL (ref 4–5.4)
RBC #/AREA URNS HPF: 6 /HPF (ref 0–4)
SODIUM SERPL-SCNC: 141 MMOL/L (ref 136–145)
SP GR UR STRIP: 1.02 (ref 1–1.03)
SQUAMOUS #/AREA URNS HPF: 14 /HPF
TROPONIN I SERPL DL<=0.01 NG/ML-MCNC: <0.006 NG/ML (ref 0–0.03)
URN SPEC COLLECT METH UR: ABNORMAL
UROBILINOGEN UR STRIP-ACNC: NEGATIVE EU/DL
WBC # BLD AUTO: 5.45 K/UL (ref 3.9–12.7)
WBC #/AREA URNS HPF: 7 /HPF (ref 0–5)

## 2024-07-13 PROCEDURE — G0378 HOSPITAL OBSERVATION PER HR: HCPCS

## 2024-07-13 PROCEDURE — 81000 URINALYSIS NONAUTO W/SCOPE: CPT | Performed by: EMERGENCY MEDICINE

## 2024-07-13 PROCEDURE — 93005 ELECTROCARDIOGRAM TRACING: CPT

## 2024-07-13 PROCEDURE — 85025 COMPLETE CBC W/AUTO DIFF WBC: CPT | Performed by: EMERGENCY MEDICINE

## 2024-07-13 PROCEDURE — 93010 ELECTROCARDIOGRAM REPORT: CPT | Mod: ,,, | Performed by: INTERNAL MEDICINE

## 2024-07-13 PROCEDURE — 83735 ASSAY OF MAGNESIUM: CPT | Performed by: EMERGENCY MEDICINE

## 2024-07-13 PROCEDURE — 99285 EMERGENCY DEPT VISIT HI MDM: CPT

## 2024-07-13 PROCEDURE — 82550 ASSAY OF CK (CPK): CPT | Performed by: EMERGENCY MEDICINE

## 2024-07-13 PROCEDURE — 99284 EMERGENCY DEPT VISIT MOD MDM: CPT | Mod: 25

## 2024-07-13 PROCEDURE — 80053 COMPREHEN METABOLIC PANEL: CPT | Performed by: EMERGENCY MEDICINE

## 2024-07-13 PROCEDURE — 84484 ASSAY OF TROPONIN QUANT: CPT | Performed by: EMERGENCY MEDICINE

## 2024-07-13 PROCEDURE — 83880 ASSAY OF NATRIURETIC PEPTIDE: CPT | Performed by: EMERGENCY MEDICINE

## 2024-07-13 RX ORDER — ONDANSETRON HYDROCHLORIDE 2 MG/ML
4 INJECTION, SOLUTION INTRAVENOUS EVERY 8 HOURS PRN
Status: DISCONTINUED | OUTPATIENT
Start: 2024-07-13 | End: 2024-07-14 | Stop reason: HOSPADM

## 2024-07-13 RX ORDER — GLUCAGON 1 MG
1 KIT INJECTION
Status: DISCONTINUED | OUTPATIENT
Start: 2024-07-13 | End: 2024-07-14 | Stop reason: HOSPADM

## 2024-07-13 RX ORDER — IBUPROFEN 200 MG
16 TABLET ORAL
Status: DISCONTINUED | OUTPATIENT
Start: 2024-07-13 | End: 2024-07-14 | Stop reason: HOSPADM

## 2024-07-13 RX ORDER — ENOXAPARIN SODIUM 100 MG/ML
40 INJECTION SUBCUTANEOUS EVERY 24 HOURS
Status: DISCONTINUED | OUTPATIENT
Start: 2024-07-13 | End: 2024-07-14 | Stop reason: HOSPADM

## 2024-07-13 RX ORDER — PROMETHAZINE HYDROCHLORIDE 25 MG/1
25 TABLET ORAL EVERY 6 HOURS PRN
Status: DISCONTINUED | OUTPATIENT
Start: 2024-07-13 | End: 2024-07-14 | Stop reason: HOSPADM

## 2024-07-13 RX ORDER — NALOXONE HCL 0.4 MG/ML
0.02 VIAL (ML) INJECTION
Status: DISCONTINUED | OUTPATIENT
Start: 2024-07-13 | End: 2024-07-14 | Stop reason: HOSPADM

## 2024-07-13 RX ORDER — IPRATROPIUM BROMIDE AND ALBUTEROL SULFATE 2.5; .5 MG/3ML; MG/3ML
3 SOLUTION RESPIRATORY (INHALATION) EVERY 6 HOURS PRN
Status: DISCONTINUED | OUTPATIENT
Start: 2024-07-13 | End: 2024-07-14 | Stop reason: HOSPADM

## 2024-07-13 RX ORDER — ALUMINUM HYDROXIDE, MAGNESIUM HYDROXIDE, AND SIMETHICONE 1200; 120; 1200 MG/30ML; MG/30ML; MG/30ML
30 SUSPENSION ORAL 4 TIMES DAILY PRN
Status: DISCONTINUED | OUTPATIENT
Start: 2024-07-13 | End: 2024-07-14 | Stop reason: HOSPADM

## 2024-07-13 RX ORDER — AMOXICILLIN 250 MG
1 CAPSULE ORAL 2 TIMES DAILY PRN
Status: DISCONTINUED | OUTPATIENT
Start: 2024-07-13 | End: 2024-07-14 | Stop reason: HOSPADM

## 2024-07-13 RX ORDER — INSULIN ASPART 100 [IU]/ML
0-5 INJECTION, SOLUTION INTRAVENOUS; SUBCUTANEOUS
Status: DISCONTINUED | OUTPATIENT
Start: 2024-07-13 | End: 2024-07-14 | Stop reason: HOSPADM

## 2024-07-13 RX ORDER — ACETAMINOPHEN 325 MG/1
650 TABLET ORAL EVERY 8 HOURS PRN
Status: DISCONTINUED | OUTPATIENT
Start: 2024-07-13 | End: 2024-07-14 | Stop reason: HOSPADM

## 2024-07-13 RX ORDER — ACETAMINOPHEN 650 MG/1
650 SUPPOSITORY RECTAL EVERY 6 HOURS PRN
Status: DISCONTINUED | OUTPATIENT
Start: 2024-07-13 | End: 2024-07-14 | Stop reason: HOSPADM

## 2024-07-13 RX ORDER — IBUPROFEN 200 MG
24 TABLET ORAL
Status: DISCONTINUED | OUTPATIENT
Start: 2024-07-13 | End: 2024-07-14 | Stop reason: HOSPADM

## 2024-07-13 RX ORDER — TALC
6 POWDER (GRAM) TOPICAL NIGHTLY PRN
Status: DISCONTINUED | OUTPATIENT
Start: 2024-07-13 | End: 2024-07-14 | Stop reason: HOSPADM

## 2024-07-13 RX ORDER — SODIUM CHLORIDE 0.9 % (FLUSH) 0.9 %
10 SYRINGE (ML) INJECTION EVERY 12 HOURS PRN
Status: DISCONTINUED | OUTPATIENT
Start: 2024-07-13 | End: 2024-07-14 | Stop reason: HOSPADM

## 2024-07-13 NOTE — ED PROVIDER NOTES
SCRIBE #1 NOTE: I, Medardo Lorenzana, am scribing for, and in the presence of, Maite Lloyd MD. I have scribed the entire note.       History     Chief Complaint   Patient presents with    Abnormal Lab     Pt had labs drawn on 7/12 that  showed elevated pth levels. Pt doctor advised the pt to come back to  ED for further eval. Pt reports weakness but denies N/V.     Review of patient's allergies indicates:   Allergen Reactions    Macrodantin [nitrofurantoin macrocrystalline] Swelling     Pt reports occurrence of tongue swelling    Bactrim [sulfamethoxazole-trimethoprim] Rash    Clarithromycin Rash    Dosoquin [vitamin d3-vitamin k2] Rash    Eszopiclone Rash    Nitrofuran analogues Rash    Pcn [penicillins] Rash    Sulfa (sulfonamide antibiotics) Rash    Tramadol Rash    Wellbutrin [bupropion hcl] Rash         History of Present Illness     [facility]    7/13/2024, 6:23 PM  History obtained from the patient      History of Present Illness: Dottie Ramos is a 73 y.o. female patient with a PMHx of CAD, HLD, HTN, and old MI who presents to the Emergency Department for evaluation of elevated calcium levels. Pt had labs drawn on 7/12 and was instructed by Dr. Kaplan to come into the ED to be evaluated. Pt's PTH is elevated as well. Pt's calcium levels have steadily increased the past few months. Pt's daughter mentions that the pt has been increasingly weak the past 10 days. Pt was evaluated for cough and back pain initially when sxs began. Pneumonia and COVID were rule out at the time. Symptoms are constant and moderate in severity. No mitigating or exacerbating factors reported. Associated sxs include lightheadedness, fluctuating BP, SOB, and decreased appetite. Patient denies any N/V, CP, fever, back pain, and all other sxs at this time. No prior Tx. No further complaints or concerns at this time.       Arrival mode: Personal vehicle    PCP: Jennifer Barahona RT        Past Medical History:  Past Medical History:    Diagnosis Date    Coronary artery disease     Hyperlipidemia     Hypertension     Old MI (myocardial infarction)     S/P PTCA (percutaneous transluminal coronary angioplasty)        Past Surgical History:  Past Surgical History:   Procedure Laterality Date    CORONARY ANGIOPLASTY           Family History:  Family History   Problem Relation Name Age of Onset    Heart attack Father      Heart attack Brother      No Known Problems Mother      No Known Problems Sister      No Known Problems Maternal Aunt      No Known Problems Maternal Uncle      No Known Problems Paternal Aunt      No Known Problems Paternal Uncle      No Known Problems Maternal Grandmother      No Known Problems Maternal Grandfather      No Known Problems Paternal Grandmother      No Known Problems Paternal Grandfather      Asthma Neg Hx      Cancer Neg Hx      Diabetes Neg Hx      Emphysema Neg Hx      Heart failure Neg Hx      Hypertension Neg Hx      Asbestos Neg Hx         Social History:  Social History     Tobacco Use    Smoking status: Former     Current packs/day: 0.00     Average packs/day: 1 pack/day for 30.0 years (30.0 ttl pk-yrs)     Types: Cigarettes     Start date: 1974     Quit date: 2004     Years since quittin.5    Smokeless tobacco: Never   Substance and Sexual Activity    Alcohol use: No    Drug use: No    Sexual activity: Not on file        Review of Systems     Review of Systems   Constitutional:  Positive for appetite change (decreased). Negative for fever.   HENT:  Negative for congestion, dental problem and sore throat.    Respiratory:  Positive for shortness of breath.    Cardiovascular:  Negative for chest pain.        (+) fluctuating BP   Gastrointestinal:  Negative for abdominal distention, abdominal pain, nausea and vomiting.   Genitourinary:  Negative for dysuria.   Musculoskeletal:  Negative for back pain.   Skin:  Negative for rash.   Neurological:  Positive for light-headedness. Negative for dizziness,  seizures, weakness and headaches.   Hematological:  Does not bruise/bleed easily.   All other systems reviewed and are negative.       Physical Exam     Initial Vitals [07/13/24 1732]   BP Pulse Resp Temp SpO2   128/71 81 16 98.5 °F (36.9 °C) 97 %      MAP       --          Physical Exam  Nursing Notes and Vital Signs Reviewed.  Constitutional: Patient is in no acute distress. Well-developed and well-nourished.  Head: Atraumatic. Normocephalic.  Eyes: PERRL. EOM intact. Conjunctivae are not pale. No scleral icterus.  ENT: Mucous membranes are moist. Oropharynx is clear and symmetric.    Neck: Supple. Full ROM. No lymphadenopathy.  Cardiovascular: Regular rate. Regular rhythm. No murmurs, rubs, or gallops. Distal pulses are 2+ and symmetric.  Pulmonary/Chest: No respiratory distress. Clear to auscultation bilaterally. No wheezing or rales.  Abdominal: Soft and non-distended.  There is no tenderness.  No rebound, guarding, or rigidity. Good bowel sounds.  Genitourinary: No CVA tenderness  Musculoskeletal: Moves all extremities. No obvious deformities. No edema. No calf tenderness.  Skin: Warm and dry.  Neurological:  Alert, awake, and appropriate.  Normal speech.  No acute focal neurological deficits are appreciated.  Psychiatric: Normal affect. Good eye contact. Appropriate in content.     ED Course   Procedures  ED Vital Signs:  Vitals:    07/13/24 1732 07/13/24 1823 07/13/24 1825 07/13/24 1834   BP: 128/71 (!) 185/79     Pulse: 81 82 81    Resp: 16 (!) 23     Temp: 98.5 °F (36.9 °C)      TempSrc: Oral      SpO2: 97% 100%     Weight:    67.4 kg (148 lb 9.4 oz)    07/13/24 1902 07/13/24 1946 07/13/24 1947 07/13/24 2002   BP: 139/68  (!) 152/85 (!) 151/76   Pulse: 79   78   Resp: (!) 32 19  (!) 23   Temp:       TempSrc:       SpO2: 96%   96%   Weight:           Abnormal Lab Results:  Labs Reviewed   URINALYSIS, REFLEX TO URINE CULTURE - Abnormal; Notable for the following components:       Result Value    Appearance,  UA Hazy (*)     Protein, UA Trace (*)     Occult Blood UA 1+ (*)     Leukocytes, UA 2+ (*)     All other components within normal limits    Narrative:     Specimen Source->Urine   URINALYSIS MICROSCOPIC - Abnormal; Notable for the following components:    RBC, UA 6 (*)     WBC, UA 7 (*)     All other components within normal limits    Narrative:     Specimen Source->Urine   CBC W/ AUTO DIFFERENTIAL   COMPREHENSIVE METABOLIC PANEL   TROPONIN I   B-TYPE NATRIURETIC PEPTIDE   CK   MAGNESIUM        All Lab Results:  Results for orders placed or performed during the hospital encounter of 07/13/24   CBC auto differential   Result Value Ref Range    WBC 5.45 3.90 - 12.70 K/uL    RBC 4.46 4.00 - 5.40 M/uL    Hemoglobin 13.7 12.0 - 16.0 g/dL    Hematocrit 40.7 37.0 - 48.5 %    MCV 91 82 - 98 fL    MCH 30.7 27.0 - 31.0 pg    MCHC 33.7 32.0 - 36.0 g/dL    RDW 12.6 11.5 - 14.5 %    Platelets 193 150 - 450 K/uL    MPV 10.4 9.2 - 12.9 fL    Immature Granulocytes 0.4 0.0 - 0.5 %    Gran # (ANC) 3.2 1.8 - 7.7 K/uL    Immature Grans (Abs) 0.02 0.00 - 0.04 K/uL    Lymph # 1.3 1.0 - 4.8 K/uL    Mono # 0.5 0.3 - 1.0 K/uL    Eos # 0.4 0.0 - 0.5 K/uL    Baso # 0.07 0.00 - 0.20 K/uL    nRBC 0 0 /100 WBC    Gran % 59.1 38.0 - 73.0 %    Lymph % 24.0 18.0 - 48.0 %    Mono % 8.4 4.0 - 15.0 %    Eosinophil % 6.8 0.0 - 8.0 %    Basophil % 1.3 0.0 - 1.9 %    Differential Method Automated    Comprehensive metabolic panel   Result Value Ref Range    Sodium 141 136 - 145 mmol/L    Potassium 3.7 3.5 - 5.1 mmol/L    Chloride 105 95 - 110 mmol/L    CO2 27 23 - 29 mmol/L    Glucose 85 70 - 110 mg/dL    BUN 16 8 - 23 mg/dL    Creatinine 0.9 0.5 - 1.4 mg/dL    Calcium 10.2 8.7 - 10.5 mg/dL    Total Protein 6.4 6.0 - 8.4 g/dL    Albumin 4.0 3.5 - 5.2 g/dL    Total Bilirubin 0.4 0.1 - 1.0 mg/dL    Alkaline Phosphatase 65 55 - 135 U/L    AST 22 10 - 40 U/L    ALT 18 10 - 44 U/L    eGFR >60 >60 mL/min/1.73 m^2    Anion Gap 9 8 - 16 mmol/L   Troponin I #1    Result Value Ref Range    Troponin I <0.006 0.000 - 0.026 ng/mL   BNP   Result Value Ref Range    BNP 66 0 - 99 pg/mL   CPK   Result Value Ref Range    CPK 44 20 - 180 U/L   Magnesium   Result Value Ref Range    Magnesium 1.7 1.6 - 2.6 mg/dL   Urinalysis, Reflex to Urine Culture Urine, Clean Catch    Specimen: Urine   Result Value Ref Range    Specimen UA Urine, Clean Catch     Color, UA Yellow Yellow, Straw, Rufina    Appearance, UA Hazy (A) Clear    pH, UA 6.0 5.0 - 8.0    Specific Gravity, UA 1.025 1.005 - 1.030    Protein, UA Trace (A) Negative    Glucose, UA Negative Negative    Ketones, UA Negative Negative    Bilirubin (UA) Negative Negative    Occult Blood UA 1+ (A) Negative    Nitrite, UA Negative Negative    Urobilinogen, UA Negative <2.0 EU/dL    Leukocytes, UA 2+ (A) Negative   Urinalysis Microscopic   Result Value Ref Range    RBC, UA 6 (H) 0 - 4 /hpf    WBC, UA 7 (H) 0 - 5 /hpf    Bacteria Rare None-Occ /hpf    Squam Epithel, UA 14 /hpf    Hyaline Casts, UA 1 0-1/lpf /lpf    Microscopic Comment SEE COMMENT          Imaging Results:  Imaging Results    None          The EKG was ordered, reviewed, and independently interpreted by the ED provider.  Interpretation time: 18:29  Rate: 77 BPM  Rhythm: normal sinus rhythm  Interpretation: Minimal voltage criteria for LVH, may be normal variant. Possible anterior infarct, age undetermined. No STEMI.             The Emergency Provider reviewed the vital signs and test results, which are outlined above.     ED Discussion       8:03 PM: Spoke with Dr. Yang who recommends the pt f/u with her PCP on Monday or return to the ER if sxs worsen. Pt's Calcium is normal and the pt's hyperparathyroidism is an outpatient workup.     8:13 PM: Reassessed pt at this time daughter at bedside, explained the above recommendations, both upset as they were told she needed admission per Dr. Kaplan again explained that this is an outpatient workup, daughter began to get  argumentative with me. I then asked hospital medicine to come have a discussion with patient and her family at the bedside.      Discussed with pt all pertinent ED information and results. Discussed pt dx and plan of tx. Gave pt all f/u and return to the ED instructions. All questions and concerns were addressed at this time. Pt expresses understanding of information and instructions, and is comfortable with plan to discharge. Pt is stable for discharge.    I discussed with patient and/or family/caretaker that evaluation in the ED does not suggest any emergent or life threatening medical conditions requiring immediate intervention beyond what was provided in the ED, and I believe patient is safe for discharge.  Regardless, an unremarkable evaluation in the ED does not preclude the development or presence of a serious of life threatening condition. As such, patient was instructed to return immediately for any worsening or change in current symptoms.     After evaluating patient, hospital medicine service decided to place patient in observation, but then after orders were placed patient and family decided they would like to be discharged.        Medical Decision Making  DDX: 1. Hypercalcemia 2. Dehydration 3. Infection 4. Kidney failure    Lab work reviewed and no concerns for kidney failure, calcium level is normal, wbc normal, cpk normal, troponin normal, mag normal, bnp normal, ECG no ischemic changes, admission considered, hospital med consulted and did not feel admission was warranted with normal calcium advised outpatient follow up and further workup for elevated PTH.     Amount and/or Complexity of Data Reviewed  External Data Reviewed: labs, radiology and notes.     Details: Outpatient CXR done day prior was normal, reviewed outpatient lab work PTH level in 140s remainder of labs pending.   Labs: ordered. Decision-making details documented in ED Course.  ECG/medicine tests: ordered and independent  interpretation performed. Decision-making details documented in ED Course.    Risk  Decision regarding hospitalization.                ED Medication(s):  Medications - No data to display    New Prescriptions    No medications on file        Follow-up Information       Alexis Kaplan MD. Schedule an appointment as soon as possible for a visit in 2 days.    Specialties: Pulmonary Disease, Critical Care Medicine  Why: Return to the Emergency Room, If symptoms worsen  Contact information:  [id number]  [place] [address]  [phone]                                 Scribe Attestation:   Scribe #1: I performed the above scribed service and the documentation accurately describes the services I performed. I attest to the accuracy of the note.     Attending:   Physician Attestation Statement for Scribe #1: I, Maite Lloyd MD, personally performed the services described in this documentation, as scribed by Medardo Lorenzana, in my presence, and it is both accurate and complete.           Clinical Impression       ICD-10-CM ICD-9-CM   1. Hyperparathyroidism  E21.3 252.00   2. Weakness  R53.1 780.79   3. Chronic hypertension  I10 401.9       Disposition:   Disposition: Discharged  Condition: Stable        Maite Lloyd MD  07/14/24 1310

## 2024-07-14 PROBLEM — R53.83 FATIGUE: Status: ACTIVE | Noted: 2024-07-14

## 2024-07-14 PROBLEM — R06.09 DYSPNEA ON EXERTION: Status: ACTIVE | Noted: 2024-07-14

## 2024-07-14 NOTE — H&P
[facility] - Emergency Dept.  [facility] Medicine  History & Physical    Patient Name: Dottie Ramos  MRN: [id number]  Patient Class: OP- Observation  Admission Date: 7/13/2024  Attending Physician: No att. providers found   Primary Care Provider: Jennifer Barahona RT         Patient information was obtained from patient, relative(s), past medical records, and ER records.     Subjective:     Principal Problem:Fatigue    Chief Complaint:   Chief Complaint   Patient presents with    Abnormal Lab     Pt had labs drawn on 7/12 that  showed elevated pth levels. Pt doctor advised the pt to come back to  ED for further eval. Pt reports weakness but denies N/V.        HPI: Dottie Ramos is a 73 y.o. female with a PMH  has a past medical history of Coronary artery disease, Hyperlipidemia, Hypertension, Old MI (myocardial infarction), and S/P PTCA (percutaneous transluminal coronary angioplasty). who presented to the ED for further evaluation of elevated calcium level.  Patient was seen by her pulmonologist on 07/12/24 with labs showed .5 and calcium 10.4 and was instructed by Dr. Kaplan to come to the ED for further evaluation and possible admission.  Patient as well as her daughter stated she has been increasingly weak over the past 10 days in addition to generalized weakness/fatigue, lightheadedness/dizziness, fluctuations in her blood pressure, dyspnea on exertion, and decreased appetite.  At time of bedside assessment, patient only complaining of tiredness/fatigue denies endorsing any fever, chills, sweats, visual changes, headache, nausea, vomiting, chest pain, shortness of breath, abdominal pain, dysuria, hematuria, melena, hematochezia, constipation, diarrhea, or onset neurological deficits.  Initial workup in the ED revealed patient to be hypertensive, intermittent tachypneic however saturating 96% on room air in no acute distress without use of accessory muscles noted.  Patient afebrile without leukocytosis.  BNP and troponin wnl.  H/H 13.7/40.7.  Remaining CBC and CMP as well as BNP, troponin, CPK within normal limits. Patient inform she likely has hyperparathyroidism which is typically inpatient versus outpatient workup depending presenting symptoms and lab findings.  Patient admitted to Hospital Medicine under observation to initiate workup of hyperparathyroidism.    PCP: Jennifer Barahona      Past Medical History:   Diagnosis Date    Coronary artery disease     Hyperlipidemia     Hypertension     Old MI (myocardial infarction)     S/P PTCA (percutaneous transluminal coronary angioplasty)        Past Surgical History:   Procedure Laterality Date    CORONARY ANGIOPLASTY         Review of patient's allergies indicates:   Allergen Reactions    Macrodantin [nitrofurantoin macrocrystalline] Swelling     Pt reports occurrence of tongue swelling    Bactrim [sulfamethoxazole-trimethoprim] Rash    Clarithromycin Rash    Dosoquin [vitamin d3-vitamin k2] Rash    Eszopiclone Rash    Nitrofuran analogues Rash    Pcn [penicillins] Rash    Sulfa (sulfonamide antibiotics) Rash    Tramadol Rash    Wellbutrin [bupropion hcl] Rash       No current facility-administered medications on file prior to encounter.     Current Outpatient Medications on File Prior to Encounter   Medication Sig    amLODIPine (NORVASC) 5 MG tablet TAKE 1 TABLET BY MOUTH ONCE DAILY IN THE EVENING    aspirin (ECOTRIN) 81 MG EC tablet Take 81 mg by mouth once daily.    cephALEXin (KEFLEX) 500 MG capsule Take 500 mg by mouth 2 (two) times daily.    clopidogrel (PLAVIX) 75 mg tablet Take 75 mg by mouth once daily.    metoprolol tartrate (LOPRESSOR) 25 MG tablet Take 25 mg by mouth 2 (two) times daily.    pantoprazole (PROTONIX) 40 MG tablet Take 40 mg by mouth 2 (two) times daily.    simvastatin (ZOCOR) 80 MG tablet Take 80 mg by mouth every evening.    zolpidem (AMBIEN) 5 MG Tab Take 5 mg by mouth nightly as needed.    albuterol (PROVENTIL/VENTOLIN HFA) 90  mcg/actuation inhaler Inhale into the lungs.    benzonatate (TESSALON) 200 MG capsule Take 1 capsule by mouth three times daily as needed for cough    budesonide-formoterol 80-4.5 mcg (SYMBICORT) 80-4.5 mcg/actuation HFAA Inhale 2 puffs into the lungs 2 (two) times a day. Controller    busPIRone (BUSPAR) 10 MG tablet Take 10 mg by mouth 3 (three) times daily.    citalopram (CELEXA) 20 MG tablet Take 20 mg by mouth once daily.    dicyclomine (BENTYL) 10 MG capsule Take 10 mg by mouth 3 (three) times daily.    doxepin (SINEQUAN) 100 MG capsule TAKE ONE CAPSULE BY MOUTH AT BEDTIME 1 AND 1/2 HOURS PRIOR TO SLEEP    escitalopram oxalate (LEXAPRO) 10 MG tablet Take 10 mg by mouth once daily. (Patient not taking: Reported on 2024)    losartan (COZAAR) 50 MG tablet Take 100 mg by mouth once daily.    nitroGLYCERIN (NITROSTAT) 0.4 MG SL tablet Place 1 tablet (0.4 mg total) under the tongue every 5 (five) minutes as needed for Chest pain.    phenazopyridine (PYRIDIUM) 200 MG tablet Take 200 mg by mouth 3 (three) times daily as needed for Pain. (Patient not taking: Reported on 2024)    [DISCONTINUED] eszopiclone (LUNESTA) 3 mg Tab Take 3 mg by mouth nightly as needed. (Patient not taking: Reported on 2024)    [DISCONTINUED] vitamin D (VITAMIN D3) 1000 units Tab Take 2,000 Units by mouth once daily. (Patient not taking: Reported on 2024)     Family History       Problem Relation (Age of Onset)    Heart attack Father, Brother    No Known Problems Mother, Sister, Maternal Aunt, Maternal Uncle, Paternal Aunt, Paternal Uncle, Maternal Grandmother, Maternal Grandfather, Paternal Grandmother, Paternal Grandfather          Tobacco Use    Smoking status: Former     Current packs/day: 0.00     Average packs/day: 1 pack/day for 30.0 years (30.0 ttl pk-yrs)     Types: Cigarettes     Start date: 1974     Quit date: 2004     Years since quittin.5    Smokeless tobacco: Never   Substance and Sexual Activity     Alcohol use: No    Drug use: No    Sexual activity: Not on file     Review of Systems   All other systems reviewed and are negative.    Objective:     Vital Signs (Most Recent):  Temp: 98.4 °F (36.9 °C) (07/13/24 2201)  Pulse: 84 (07/13/24 2201)  Resp: (!) 27 (07/13/24 2201)  BP: (!) 148/72 (07/13/24 2201)  SpO2: 96 % (07/13/24 2201) Vital Signs (24h Range):  Temp:  [98.4 °F (36.9 °C)-98.5 °F (36.9 °C)] 98.4 °F (36.9 °C)  Pulse:  [78-84] 84  Resp:  [16-32] 27  SpO2:  [95 %-100 %] 96 %  BP: (128-185)/(68-85) 148/72     Weight: 67.4 kg (148 lb 9.4 oz)  Body mass index is 29.02 kg/m².     Physical Exam  Vitals reviewed.   Constitutional:       General: She is not in acute distress.     Appearance: Normal appearance. She is normal weight. She is not ill-appearing, toxic-appearing or diaphoretic.   HENT:      Head: Normocephalic and atraumatic.      Right Ear: External ear normal.      Left Ear: External ear normal.      Nose: Nose normal. No congestion or rhinorrhea.      Mouth/Throat:      Mouth: Mucous membranes are moist.      Pharynx: Oropharynx is clear. No oropharyngeal exudate or posterior oropharyngeal erythema.   Eyes:      General: No scleral icterus.     Extraocular Movements: Extraocular movements intact.      Conjunctiva/sclera: Conjunctivae normal.      Pupils: Pupils are equal, round, and reactive to light.   Neck:      Vascular: No carotid bruit.   Cardiovascular:      Rate and Rhythm: Normal rate and regular rhythm.      Pulses: Normal pulses.      Heart sounds: Normal heart sounds. No murmur heard.     No friction rub. No gallop.   Pulmonary:      Effort: Pulmonary effort is normal. No respiratory distress.      Breath sounds: Normal breath sounds. No stridor. No wheezing, rhonchi or rales.      Comments: Patient intermittent tachypneic however without use of accessory muscles noted in his without evidence of wheezing, rales, rhonchi present.  Chest:      Chest wall: No tenderness.   Abdominal:       General: Abdomen is flat. Bowel sounds are normal. There is no distension.      Palpations: Abdomen is soft.      Tenderness: There is no abdominal tenderness. There is no right CVA tenderness, left CVA tenderness, guarding or rebound.      Hernia: No hernia is present.   Musculoskeletal:         General: No swelling, tenderness, deformity or signs of injury. Normal range of motion.      Cervical back: Normal range of motion and neck supple. No rigidity or tenderness.      Right lower leg: No edema.      Left lower leg: No edema.   Lymphadenopathy:      Cervical: No cervical adenopathy.   Skin:     General: Skin is warm and dry.      Capillary Refill: Capillary refill takes less than 2 seconds.      Coloration: Skin is not jaundiced or pale.      Findings: No bruising, erythema, lesion or rash.   Neurological:      General: No focal deficit present.      Mental Status: She is alert and oriented to person, place, and time. Mental status is at baseline.      Cranial Nerves: No cranial nerve deficit.      Sensory: No sensory deficit.      Motor: No weakness.      Coordination: Coordination normal.   Psychiatric:         Mood and Affect: Mood normal.         Behavior: Behavior normal.         Thought Content: Thought content normal.         Judgment: Judgment normal.              CRANIAL NERVES     CN III, IV, VI   Pupils are equal, round, and reactive to light.       Significant Labs: All pertinent labs within the past 24 hours have been reviewed.    Significant Imaging: I have reviewed all pertinent imaging results/findings within the past 24 hours.    LABS:  Recent Results (from the past 24 hour(s))   Urinalysis, Reflex to Urine Culture Urine, Clean Catch    Collection Time: 07/13/24  6:26 PM    Specimen: Urine   Result Value Ref Range    Specimen UA Urine, Clean Catch     Color, UA Yellow Yellow, Straw, Rufina    Appearance, UA Hazy (A) Clear    pH, UA 6.0 5.0 - 8.0    Specific Gravity, UA 1.025 1.005 - 1.030     Protein, UA Trace (A) Negative    Glucose, UA Negative Negative    Ketones, UA Negative Negative    Bilirubin (UA) Negative Negative    Occult Blood UA 1+ (A) Negative    Nitrite, UA Negative Negative    Urobilinogen, UA Negative <2.0 EU/dL    Leukocytes, UA 2+ (A) Negative   Urinalysis Microscopic    Collection Time: 07/13/24  6:26 PM   Result Value Ref Range    RBC, UA 6 (H) 0 - 4 /hpf    WBC, UA 7 (H) 0 - 5 /hpf    Bacteria Rare None-Occ /hpf    Squam Epithel, UA 14 /hpf    Hyaline Casts, UA 1 0-1/lpf /lpf    Microscopic Comment SEE COMMENT    CBC auto differential    Collection Time: 07/13/24  6:35 PM   Result Value Ref Range    WBC 5.45 3.90 - 12.70 K/uL    RBC 4.46 4.00 - 5.40 M/uL    Hemoglobin 13.7 12.0 - 16.0 g/dL    Hematocrit 40.7 37.0 - 48.5 %    MCV 91 82 - 98 fL    MCH 30.7 27.0 - 31.0 pg    MCHC 33.7 32.0 - 36.0 g/dL    RDW 12.6 11.5 - 14.5 %    Platelets 193 150 - 450 K/uL    MPV 10.4 9.2 - 12.9 fL    Immature Granulocytes 0.4 0.0 - 0.5 %    Gran # (ANC) 3.2 1.8 - 7.7 K/uL    Immature Grans (Abs) 0.02 0.00 - 0.04 K/uL    Lymph # 1.3 1.0 - 4.8 K/uL    Mono # 0.5 0.3 - 1.0 K/uL    Eos # 0.4 0.0 - 0.5 K/uL    Baso # 0.07 0.00 - 0.20 K/uL    nRBC 0 0 /100 WBC    Gran % 59.1 38.0 - 73.0 %    Lymph % 24.0 18.0 - 48.0 %    Mono % 8.4 4.0 - 15.0 %    Eosinophil % 6.8 0.0 - 8.0 %    Basophil % 1.3 0.0 - 1.9 %    Differential Method Automated    Comprehensive metabolic panel    Collection Time: 07/13/24  6:35 PM   Result Value Ref Range    Sodium 141 136 - 145 mmol/L    Potassium 3.7 3.5 - 5.1 mmol/L    Chloride 105 95 - 110 mmol/L    CO2 27 23 - 29 mmol/L    Glucose 85 70 - 110 mg/dL    BUN 16 8 - 23 mg/dL    Creatinine 0.9 0.5 - 1.4 mg/dL    Calcium 10.2 8.7 - 10.5 mg/dL    Total Protein 6.4 6.0 - 8.4 g/dL    Albumin 4.0 3.5 - 5.2 g/dL    Total Bilirubin 0.4 0.1 - 1.0 mg/dL    Alkaline Phosphatase 65 55 - 135 U/L    AST 22 10 - 40 U/L    ALT 18 10 - 44 U/L    eGFR >60 >60 mL/min/1.73 m^2    Anion Gap 9 8 -  16 mmol/L   Troponin I #1    Collection Time: 07/13/24  6:35 PM   Result Value Ref Range    Troponin I <0.006 0.000 - 0.026 ng/mL   BNP    Collection Time: 07/13/24  6:35 PM   Result Value Ref Range    BNP 66 0 - 99 pg/mL   CPK    Collection Time: 07/13/24  6:35 PM   Result Value Ref Range    CPK 44 20 - 180 U/L   Magnesium    Collection Time: 07/13/24  6:35 PM   Result Value Ref Range    Magnesium 1.7 1.6 - 2.6 mg/dL       RADIOLOGY  X-Ray Chest PA And Lateral    Result Date: 7/12/2024  EXAMINATION: XR CHEST PA AND LATERAL CLINICAL HISTORY: Unspecified asthma, uncomplicated TECHNIQUE: PA and lateral views of the chest were performed. COMPARISON: Multiple priors, most recent 02/15/2023 FINDINGS: Bibasilar atelectasis.  Otherwise,the lungs are clear.  No focal consolidation, pleural effusion or pneumothorax. Normal cardiomediastinal contour. No acute or aggressive osseous abnormality.     No acute cardiopulmonary abnormality. Electronically signed by: Lamar Trevino Date:    07/12/2024 Time:    14:55      EKG    MICROBIOLOGY    [facility]    Assessment/Plan:     * Fatigue    Dyspnea on exertion    Hyperparathyroidism  Patient presented with elevated PTH measuring 147.5 tonight increased from 93 back on 6/7/24. Ca noted to be 10.4 on 6/7/24 however wnl tonight.  Patient reports endorsing generalized fatigue, fluctuations in her blood pressure, lightheadedness/dizziness, and decreased p.o. intake/appetite.  Patient hypertensive and intermittent tachypneic however saturating 96% on room air in no acute distress without use of accessory muscles noted.  Patient afebrile without leukocytosis. BNP and troponin wnl.  H/H 13.7/40.7. Lungs clear to auscultation bilaterally. Patient also reported bilateral kidney stones. Patient admitted to initiate workup of hyperparathyroidism.  Plan:  -telemetry  -monitor labs  -24 hour urine calcium collection  -f/u vitamin-D level      Hypertension  Chronic, uncontrolled. Latest blood pressure and  vitals reviewed-     Temp:  [98.4 °F (36.9 °C)-98.5 °F (36.9 °C)]   Pulse:  [78-84]   Resp:  [16-32]   BP: (128-185)/(68-85)   SpO2:  [94 %-100 %] .   Home meds for hypertension were reviewed and noted below.   Hypertension Medications               amLODIPine (NORVASC) 5 MG tablet TAKE 1 TABLET BY MOUTH ONCE DAILY IN THE EVENING    losartan (COZAAR) 50 MG tablet Take 100 mg by mouth once daily.    metoprolol tartrate (LOPRESSOR) 25 MG tablet Take 25 mg by mouth 2 (two) times daily.    nitroGLYCERIN (NITROSTAT) 0.4 MG SL tablet Place 1 tablet (0.4 mg total) under the tongue every 5 (five) minutes as needed for Chest pain.     While in the hospital, will manage blood pressure as follows; Continue home antihypertensive regimen    Will utilize p.r.n. blood pressure medication only if patient's blood pressure greater than 160/100 and she develops symptoms such as worsening chest pain or shortness of breath.      Coronary artery disease  Patient with known CAD s/p stent placement, which is controlled Will continue  home medications  and monitor for S/Sx of angina/ACS. Continue to monitor on telemetry.       Hyperlipidemia  Patient is chronically on statin.will continue for now. Last Lipid Panel:   Lab Results   Component Value Date    CHOL 170 04/25/2024    HDL 62 04/25/2024    LDLCALC 87 04/25/2024    TRIG 116 04/25/2024    CHOLHDL 39.2 02/28/2020   Plan:  -Continue home medication  -low fat/low calorie diet        VTE Risk Mitigation (From admission, onward)           Ordered     IP VTE HIGH RISK PATIENT  Once         07/13/24 2150                  //Core Measures   -DVT proph: SCDs, Lovenox   -Code status: Full    -Surrogate: daughters      Components of this note were documented using a voice recognition system and are subject to errors not corrected at the time the document was proof read. Please contact the author for any clarifications.       On 07/13/2024, patient should be placed in hospital observation services  under my care.       Ishan Yang MD  Department of Hospital Medicine  '[place] - Emergency Dept.

## 2024-07-14 NOTE — ASSESSMENT & PLAN NOTE
Chronic, uncontrolled. Latest blood pressure and vitals reviewed-     Temp:  [98.4 °F (36.9 °C)-98.5 °F (36.9 °C)]   Pulse:  [78-84]   Resp:  [16-32]   BP: (128-185)/(68-85)   SpO2:  [94 %-100 %] .   Home meds for hypertension were reviewed and noted below.   Hypertension Medications               amLODIPine (NORVASC) 5 MG tablet TAKE 1 TABLET BY MOUTH ONCE DAILY IN THE EVENING    losartan (COZAAR) 50 MG tablet Take 100 mg by mouth once daily.    metoprolol tartrate (LOPRESSOR) 25 MG tablet Take 25 mg by mouth 2 (two) times daily.    nitroGLYCERIN (NITROSTAT) 0.4 MG SL tablet Place 1 tablet (0.4 mg total) under the tongue every 5 (five) minutes as needed for Chest pain.     While in the hospital, will manage blood pressure as follows; Continue home antihypertensive regimen    Will utilize p.r.n. blood pressure medication only if patient's blood pressure greater than 160/100 and she develops symptoms such as worsening chest pain or shortness of breath.

## 2024-07-14 NOTE — ASSESSMENT & PLAN NOTE
Patient presented with elevated PTH measuring 147.5 tonight increased from 93 back on 6/7/24. Ca noted to be 10.4 on 6/7/24 however wnl tonight.  Patient reports endorsing generalized fatigue, fluctuations in her blood pressure, lightheadedness/dizziness, and decreased p.o. intake/appetite.  Patient hypertensive and intermittent tachypneic however saturating 96% on room air in no acute distress without use of accessory muscles noted.  Patient afebrile without leukocytosis. BNP and troponin wnl.  H/H 13.7/40.7. Lungs clear to auscultation bilaterally. Patient also reported bilateral kidney stones. Patient admitted to initiate workup of hyperparathyroidism.  Plan:  -telemetry  -monitor labs  -24 hour urine calcium collection  -f/u vitamin-D level

## 2024-07-14 NOTE — DISCHARGE SUMMARY
[facility] - Emergency Dept.  [facility] Medicine  Discharge Summary      Patient Name: Dottie Ramos  MRN: [id number]  Admission Date: 7/13/2024  Hospital Length of Stay: 0 days  Discharge Date and Time:  07/13/2024 10:35 PM  Attending Physician: No att. providers found   Discharging Provider: Ishan Yang MD  Discharge Provider Team: Networked reference to record PCT   Primary Care Provider: Jennifer Barahona RT        HPI: Dottie Ramos is a 73 y.o. female with a PMH has a past medical history of Coronary artery disease, Hyperlipidemia, Hypertension, Old MI (myocardial infarction), and S/P PTCA (percutaneous transluminal coronary angioplasty). who presented to the ED for further evaluation of elevated calcium level. Patient was seen by her pulmonologist on 07/12/24 with labs showed .5 and calcium 10.4 and was instructed by Dr. Kaplan to come to the ED for further evaluation and possible admission. Patient as well as her daughter stated she has been increasingly weak over the past 10 days in addition to generalized weakness/fatigue, lightheadedness/dizziness, fluctuations in her blood pressure, dyspnea on exertion, and decreased appetite. At time of bedside assessment, patient only complaining of tiredness/fatigue denies endorsing any fever, chills, sweats, visual changes, headache, nausea, vomiting, chest pain, shortness of breath, abdominal pain, dysuria, hematuria, melena, hematochezia, constipation, diarrhea, or onset neurological deficits. Initial workup in the ED revealed patient to be hypertensive, intermittent tachypneic however saturating 96% on room air in no acute distress without use of accessory muscles noted. Patient afebrile without leukocytosis. BNP and troponin wnl. H/H 13.7/40.7. Remaining CBC and CMP as well as BNP, troponin, CPK within normal limits. Patient inform she likely has hyperparathyroidism which is typically inpatient versus outpatient workup depending presenting symptoms and  lab findings. Patient admitted to Hospital Medicine under observation to initiate workup of hyperparathyroidism.      * No surgery found *      Hospital Course:  Patient was admitted for further workup of hyperparathyroidism and elected to leave AMA/be discharged home from ED with outpatient follow up on Monday. Patient and family informed of plan following initial admission process which included cardiac monitoring and repeat chemistry checks in addition to obtaining 24 hour urine calcium collection as part of hyperparathyroidism workup.  Patient educated on associated morbidity and mortality associated with leaving prior to completion of medical treatment/diagnostic workup inpatient adamant about going home and following up with her primary doctor on Monday.  Patient instructed to return to nearest ED or call 911 if patient experience new or worsening symptoms as described at time of discharge.  Medical issues being addressed during initial admission process as noted below.        * Fatigue     Dyspnea on exertion     Hyperparathyroidism  Patient presented with elevated PTH measuring 147.5 tonight increased from 93 back on 6/7/24. Ca noted to be 10.4 on 6/7/24 however wnl tonight.  Patient reports endorsing generalized fatigue, fluctuations in her blood pressure, lightheadedness/dizziness, and decreased p.o. intake/appetite.  Patient hypertensive and intermittent tachypneic however saturating 96% on room air in no acute distress without use of accessory muscles noted.  Patient afebrile without leukocytosis. BNP and troponin wnl.  H/H 13.7/40.7. Lungs clear to auscultation bilaterally. Patient also reported bilateral kidney stones. Patient admitted to initiate workup of hyperparathyroidism.  Plan:  -telemetry  -monitor labs  -24 hour urine calcium collection  -f/u vitamin-D level        Hypertension  Chronic, uncontrolled. Latest blood pressure and vitals reviewed-      Temp:  [98.4 °F (36.9 °C)-98.5 °F (36.9 °C)]    Pulse:  [78-84]   Resp:  [16-32]   BP: (128-185)/(68-85)   SpO2:  [94 %-100 %] .   Home meds for hypertension were reviewed and noted below.   Hypertension Medications                    amLODIPine (NORVASC) 5 MG tablet TAKE 1 TABLET BY MOUTH ONCE DAILY IN THE EVENING     losartan (COZAAR) 50 MG tablet Take 100 mg by mouth once daily.     metoprolol tartrate (LOPRESSOR) 25 MG tablet Take 25 mg by mouth 2 (two) times daily.     nitroGLYCERIN (NITROSTAT) 0.4 MG SL tablet Place 1 tablet (0.4 mg total) under the tongue every 5 (five) minutes as needed for Chest pain.      While in the hospital, will manage blood pressure as follows; Continue home antihypertensive regimen     Will utilize p.r.n. blood pressure medication only if patient's blood pressure greater than 160/100 and she develops symptoms such as worsening chest pain or shortness of breath.        Coronary artery disease  Patient with known CAD s/p stent placement, which is controlled Will continue  home medications  and monitor for S/Sx of angina/ACS. Continue to monitor on telemetry.         Hyperlipidemia  Patient is chronically on statin.will continue for now. Last Lipid Panel:         Lab Results   Component Value Date     CHOL 170 04/25/2024     HDL 62 04/25/2024     LDLCALC 87 04/25/2024     TRIG 116 04/25/2024     CHOLHDL 39.2 02/28/2020   Plan:  -Continue home medication  -low fat/low calorie diet     Consults: NONE    Final Active Diagnoses:    Diagnosis Date Noted POA    PRINCIPAL PROBLEM:  Hyperparathyroidism [E21.3] 07/13/2024 Yes      Problems Resolved During this Admission:      Discharged Condition: stable    Disposition: Left Against Medical Advice/Discharged from the ED prior to floor admission     Follow Up:   Follow-up Information       Alexis Kaplan MD. Schedule an appointment as soon as possible for a visit in 2 days.    Specialties: Pulmonary Disease, Critical Care Medicine  Why: Return to the Emergency Room, If symptoms  worsen  Contact information:  [address]  [place] [address]  [phone]                           Patient Instructions:   No discharge procedures on file.  Medications:  Reconciled Home Medications:      Medication List        CONTINUE taking these medications      albuterol 90 mcg/actuation inhaler  Commonly known as: PROVENTIL/VENTOLIN HFA  Inhale into the lungs.     amLODIPine 5 MG tablet  Commonly known as: NORVASC  TAKE 1 TABLET BY MOUTH ONCE DAILY IN THE EVENING     aspirin 81 MG EC tablet  Commonly known as: ECOTRIN  Take 81 mg by mouth once daily.     benzonatate 200 MG capsule  Commonly known as: TESSALON  Take 1 capsule by mouth three times daily as needed for cough     budesonide-formoterol 80-4.5 mcg 80-4.5 mcg/actuation Hfaa  Commonly known as: SYMBICORT  Inhale 2 puffs into the lungs 2 (two) times a day. Controller     cephALEXin 500 MG capsule  Commonly known as: KEFLEX  Take 500 mg by mouth 2 (two) times daily.     citalopram 20 MG tablet  Commonly known as: CeleXA  Take 20 mg by mouth once daily.     clopidogreL 75 mg tablet  Commonly known as: PLAVIX  Take 75 mg by mouth once daily.     dicyclomine 10 MG capsule  Commonly known as: BENTYL  Take 10 mg by mouth 3 (three) times daily.     doxepin 100 MG capsule  Commonly known as: SINEQUAN  TAKE ONE CAPSULE BY MOUTH AT BEDTIME 1 AND 1/2 HOURS PRIOR TO SLEEP     losartan 50 MG tablet  Commonly known as: COZAAR  Take 100 mg by mouth once daily.     metoprolol tartrate 25 MG tablet  Commonly known as: LOPRESSOR  Take 25 mg by mouth 2 (two) times daily.     nitroGLYCERIN 0.4 MG SL tablet  Commonly known as: NITROSTAT  Place 1 tablet (0.4 mg total) under the tongue every 5 (five) minutes as needed for Chest pain.     pantoprazole 40 MG tablet  Commonly known as: PROTONIX  Take 40 mg by mouth 2 (two) times daily.     simvastatin 80 MG tablet  Commonly known as: ZOCOR  Take 80 mg by mouth every evening.     zolpidem 5 MG Tab  Commonly known  as: AMBIEN  Take 5 mg by mouth nightly as needed.            ASK your doctor about these medications      busPIRone 10 MG tablet  Commonly known as: BUSPAR  Take 10 mg by mouth 3 (three) times daily.     EScitalopram oxalate 10 MG tablet  Commonly known as: LEXAPRO  Take 10 mg by mouth once daily.     phenazopyridine 200 MG tablet  Commonly known as: PYRIDIUM  Take 200 mg by mouth 3 (three) times daily as needed for Pain.              Significant Diagnostic Studies: Labs: CMP   Recent Labs   Lab 07/13/24  1835      K 3.7      CO2 27   GLU 85   BUN 16   CREATININE 0.9   CALCIUM 10.2   PROT 6.4   ALBUMIN 4.0   BILITOT 0.4   ALKPHOS 65   AST 22   ALT 18   ANIONGAP 9   , CBC   Recent Labs   Lab 07/13/24  1835   WBC 5.45   HGB 13.7   HCT 40.7      , and BNP, Troponin, CPK, PTH, UA    Pending Diagnostic Studies:       None          Indwelling Lines/Drains at time of discharge:   Lines/Drains/Airways       None                   Time spent on the discharge of patient: 40 minutes       Ishan Yang MD  Department of Hospital Medicine  [facility] - Emergency Dept.

## 2024-07-14 NOTE — HPI
Dottie Ramos is a 73 y.o. female with a PMH  has a past medical history of Coronary artery disease, Hyperlipidemia, Hypertension, Old MI (myocardial infarction), and S/P PTCA (percutaneous transluminal coronary angioplasty). who presented to the ED for further evaluation of elevated calcium level.  Patient was seen by her pulmonologist on 07/12/24 with labs showed .5 and calcium 10.4 and was instructed by Dr. Kaplan to come to the ED for further evaluation and possible admission.  Patient as well as her daughter stated she has been increasingly weak over the past 10 days in addition to generalized weakness/fatigue, lightheadedness/dizziness, fluctuations in her blood pressure, dyspnea on exertion, and decreased appetite.  At time of bedside assessment, patient only complaining of tiredness/fatigue denies endorsing any fever, chills, sweats, visual changes, headache, nausea, vomiting, chest pain, shortness of breath, abdominal pain, dysuria, hematuria, melena, hematochezia, constipation, diarrhea, or onset neurological deficits.  Initial workup in the ED revealed patient to be hypertensive, intermittent tachypneic however saturating 96% on room air in no acute distress without use of accessory muscles noted.  Patient afebrile without leukocytosis. BNP and troponin wnl.  H/H 13.7/40.7.  Remaining CBC and CMP as well as BNP, troponin, CPK within normal limits. Patient inform she likely has hyperparathyroidism which is typically inpatient versus outpatient workup depending presenting symptoms and lab findings.  Patient admitted to Hospital Medicine under observation to initiate workup of hyperparathyroidism.    PCP: Jennifer Barahona

## 2024-07-14 NOTE — SUBJECTIVE & OBJECTIVE
Past Medical History:   Diagnosis Date    Coronary artery disease     Hyperlipidemia     Hypertension     Old MI (myocardial infarction)     S/P PTCA (percutaneous transluminal coronary angioplasty)        Past Surgical History:   Procedure Laterality Date    CORONARY ANGIOPLASTY         Review of patient's allergies indicates:   Allergen Reactions    Macrodantin [nitrofurantoin macrocrystalline] Swelling     Pt reports occurrence of tongue swelling    Bactrim [sulfamethoxazole-trimethoprim] Rash    Clarithromycin Rash    Dosoquin [vitamin d3-vitamin k2] Rash    Eszopiclone Rash    Nitrofuran analogues Rash    Pcn [penicillins] Rash    Sulfa (sulfonamide antibiotics) Rash    Tramadol Rash    Wellbutrin [bupropion hcl] Rash       No current facility-administered medications on file prior to encounter.     Current Outpatient Medications on File Prior to Encounter   Medication Sig    amLODIPine (NORVASC) 5 MG tablet TAKE 1 TABLET BY MOUTH ONCE DAILY IN THE EVENING    aspirin (ECOTRIN) 81 MG EC tablet Take 81 mg by mouth once daily.    cephALEXin (KEFLEX) 500 MG capsule Take 500 mg by mouth 2 (two) times daily.    clopidogrel (PLAVIX) 75 mg tablet Take 75 mg by mouth once daily.    metoprolol tartrate (LOPRESSOR) 25 MG tablet Take 25 mg by mouth 2 (two) times daily.    pantoprazole (PROTONIX) 40 MG tablet Take 40 mg by mouth 2 (two) times daily.    simvastatin (ZOCOR) 80 MG tablet Take 80 mg by mouth every evening.    zolpidem (AMBIEN) 5 MG Tab Take 5 mg by mouth nightly as needed.    albuterol (PROVENTIL/VENTOLIN HFA) 90 mcg/actuation inhaler Inhale into the lungs.    benzonatate (TESSALON) 200 MG capsule Take 1 capsule by mouth three times daily as needed for cough    budesonide-formoterol 80-4.5 mcg (SYMBICORT) 80-4.5 mcg/actuation HFAA Inhale 2 puffs into the lungs 2 (two) times a day. Controller    busPIRone (BUSPAR) 10 MG tablet Take 10 mg by mouth 3 (three) times daily.    citalopram (CELEXA) 20 MG tablet Take 20  mg by mouth once daily.    dicyclomine (BENTYL) 10 MG capsule Take 10 mg by mouth 3 (three) times daily.    doxepin (SINEQUAN) 100 MG capsule TAKE ONE CAPSULE BY MOUTH AT BEDTIME 1 AND 1/2 HOURS PRIOR TO SLEEP    escitalopram oxalate (LEXAPRO) 10 MG tablet Take 10 mg by mouth once daily. (Patient not taking: Reported on 2024)    losartan (COZAAR) 50 MG tablet Take 100 mg by mouth once daily.    nitroGLYCERIN (NITROSTAT) 0.4 MG SL tablet Place 1 tablet (0.4 mg total) under the tongue every 5 (five) minutes as needed for Chest pain.    phenazopyridine (PYRIDIUM) 200 MG tablet Take 200 mg by mouth 3 (three) times daily as needed for Pain. (Patient not taking: Reported on 2024)    [DISCONTINUED] eszopiclone (LUNESTA) 3 mg Tab Take 3 mg by mouth nightly as needed. (Patient not taking: Reported on 2024)    [DISCONTINUED] vitamin D (VITAMIN D3) 1000 units Tab Take 2,000 Units by mouth once daily. (Patient not taking: Reported on 2024)     Family History       Problem Relation (Age of Onset)    Heart attack Father, Brother    No Known Problems Mother, Sister, Maternal Aunt, Maternal Uncle, Paternal Aunt, Paternal Uncle, Maternal Grandmother, Maternal Grandfather, Paternal Grandmother, Paternal Grandfather          Tobacco Use    Smoking status: Former     Current packs/day: 0.00     Average packs/day: 1 pack/day for 30.0 years (30.0 ttl pk-yrs)     Types: Cigarettes     Start date: 1974     Quit date: 2004     Years since quittin.5    Smokeless tobacco: Never   Substance and Sexual Activity    Alcohol use: No    Drug use: No    Sexual activity: Not on file     Review of Systems   All other systems reviewed and are negative.    Objective:     Vital Signs (Most Recent):  Temp: 98.4 °F (36.9 °C) (24)  Pulse: 84 (24)  Resp: (!) 27 (24)  BP: (!) 148/72 (24)  SpO2: 96 % (24) Vital Signs (24h Range):  Temp:  [98.4 °F (36.9 °C)-98.5 °F (36.9  °C)] 98.4 °F (36.9 °C)  Pulse:  [78-84] 84  Resp:  [16-32] 27  SpO2:  [95 %-100 %] 96 %  BP: (128-185)/(68-85) 148/72     Weight: 67.4 kg (148 lb 9.4 oz)  Body mass index is 29.02 kg/m².     Physical Exam  Vitals reviewed.   Constitutional:       General: She is not in acute distress.     Appearance: Normal appearance. She is normal weight. She is not ill-appearing, toxic-appearing or diaphoretic.   HENT:      Head: Normocephalic and atraumatic.      Right Ear: External ear normal.      Left Ear: External ear normal.      Nose: Nose normal. No congestion or rhinorrhea.      Mouth/Throat:      Mouth: Mucous membranes are moist.      Pharynx: Oropharynx is clear. No oropharyngeal exudate or posterior oropharyngeal erythema.   Eyes:      General: No scleral icterus.     Extraocular Movements: Extraocular movements intact.      Conjunctiva/sclera: Conjunctivae normal.      Pupils: Pupils are equal, round, and reactive to light.   Neck:      Vascular: No carotid bruit.   Cardiovascular:      Rate and Rhythm: Normal rate and regular rhythm.      Pulses: Normal pulses.      Heart sounds: Normal heart sounds. No murmur heard.     No friction rub. No gallop.   Pulmonary:      Effort: Pulmonary effort is normal. No respiratory distress.      Breath sounds: Normal breath sounds. No stridor. No wheezing, rhonchi or rales.      Comments: Patient intermittent tachypneic however without use of accessory muscles noted in his without evidence of wheezing, rales, rhonchi present.  Chest:      Chest wall: No tenderness.   Abdominal:      General: Abdomen is flat. Bowel sounds are normal. There is no distension.      Palpations: Abdomen is soft.      Tenderness: There is no abdominal tenderness. There is no right CVA tenderness, left CVA tenderness, guarding or rebound.      Hernia: No hernia is present.   Musculoskeletal:         General: No swelling, tenderness, deformity or signs of injury. Normal range of motion.      Cervical  back: Normal range of motion and neck supple. No rigidity or tenderness.      Right lower leg: No edema.      Left lower leg: No edema.   Lymphadenopathy:      Cervical: No cervical adenopathy.   Skin:     General: Skin is warm and dry.      Capillary Refill: Capillary refill takes less than 2 seconds.      Coloration: Skin is not jaundiced or pale.      Findings: No bruising, erythema, lesion or rash.   Neurological:      General: No focal deficit present.      Mental Status: She is alert and oriented to person, place, and time. Mental status is at baseline.      Cranial Nerves: No cranial nerve deficit.      Sensory: No sensory deficit.      Motor: No weakness.      Coordination: Coordination normal.   Psychiatric:         Mood and Affect: Mood normal.         Behavior: Behavior normal.         Thought Content: Thought content normal.         Judgment: Judgment normal.              CRANIAL NERVES     CN III, IV, VI   Pupils are equal, round, and reactive to light.       Significant Labs: All pertinent labs within the past 24 hours have been reviewed.    Significant Imaging: I have reviewed all pertinent imaging results/findings within the past 24 hours.    LABS:  Recent Results (from the past 24 hour(s))   Urinalysis, Reflex to Urine Culture Urine, Clean Catch    Collection Time: 07/13/24  6:26 PM    Specimen: Urine   Result Value Ref Range    Specimen UA Urine, Clean Catch     Color, UA Yellow Yellow, Straw, Rufina    Appearance, UA Hazy (A) Clear    pH, UA 6.0 5.0 - 8.0    Specific Gravity, UA 1.025 1.005 - 1.030    Protein, UA Trace (A) Negative    Glucose, UA Negative Negative    Ketones, UA Negative Negative    Bilirubin (UA) Negative Negative    Occult Blood UA 1+ (A) Negative    Nitrite, UA Negative Negative    Urobilinogen, UA Negative <2.0 EU/dL    Leukocytes, UA 2+ (A) Negative   Urinalysis Microscopic    Collection Time: 07/13/24  6:26 PM   Result Value Ref Range    RBC, UA 6 (H) 0 - 4 /hpf    WBC, UA 7  (H) 0 - 5 /hpf    Bacteria Rare None-Occ /hpf    Squam Epithel, UA 14 /hpf    Hyaline Casts, UA 1 0-1/lpf /lpf    Microscopic Comment SEE COMMENT    CBC auto differential    Collection Time: 07/13/24  6:35 PM   Result Value Ref Range    WBC 5.45 3.90 - 12.70 K/uL    RBC 4.46 4.00 - 5.40 M/uL    Hemoglobin 13.7 12.0 - 16.0 g/dL    Hematocrit 40.7 37.0 - 48.5 %    MCV 91 82 - 98 fL    MCH 30.7 27.0 - 31.0 pg    MCHC 33.7 32.0 - 36.0 g/dL    RDW 12.6 11.5 - 14.5 %    Platelets 193 150 - 450 K/uL    MPV 10.4 9.2 - 12.9 fL    Immature Granulocytes 0.4 0.0 - 0.5 %    Gran # (ANC) 3.2 1.8 - 7.7 K/uL    Immature Grans (Abs) 0.02 0.00 - 0.04 K/uL    Lymph # 1.3 1.0 - 4.8 K/uL    Mono # 0.5 0.3 - 1.0 K/uL    Eos # 0.4 0.0 - 0.5 K/uL    Baso # 0.07 0.00 - 0.20 K/uL    nRBC 0 0 /100 WBC    Gran % 59.1 38.0 - 73.0 %    Lymph % 24.0 18.0 - 48.0 %    Mono % 8.4 4.0 - 15.0 %    Eosinophil % 6.8 0.0 - 8.0 %    Basophil % 1.3 0.0 - 1.9 %    Differential Method Automated    Comprehensive metabolic panel    Collection Time: 07/13/24  6:35 PM   Result Value Ref Range    Sodium 141 136 - 145 mmol/L    Potassium 3.7 3.5 - 5.1 mmol/L    Chloride 105 95 - 110 mmol/L    CO2 27 23 - 29 mmol/L    Glucose 85 70 - 110 mg/dL    BUN 16 8 - 23 mg/dL    Creatinine 0.9 0.5 - 1.4 mg/dL    Calcium 10.2 8.7 - 10.5 mg/dL    Total Protein 6.4 6.0 - 8.4 g/dL    Albumin 4.0 3.5 - 5.2 g/dL    Total Bilirubin 0.4 0.1 - 1.0 mg/dL    Alkaline Phosphatase 65 55 - 135 U/L    AST 22 10 - 40 U/L    ALT 18 10 - 44 U/L    eGFR >60 >60 mL/min/1.73 m^2    Anion Gap 9 8 - 16 mmol/L   Troponin I #1    Collection Time: 07/13/24  6:35 PM   Result Value Ref Range    Troponin I <0.006 0.000 - 0.026 ng/mL   BNP    Collection Time: 07/13/24  6:35 PM   Result Value Ref Range    BNP 66 0 - 99 pg/mL   CPK    Collection Time: 07/13/24  6:35 PM   Result Value Ref Range    CPK 44 20 - 180 U/L   Magnesium    Collection Time: 07/13/24  6:35 PM   Result Value Ref Range    Magnesium 1.7  1.6 - 2.6 mg/dL       RADIOLOGY  X-Ray Chest PA And Lateral    Result Date: 7/12/2024  EXAMINATION: XR CHEST PA AND LATERAL CLINICAL HISTORY: Unspecified asthma, uncomplicated TECHNIQUE: PA and lateral views of the chest were performed. COMPARISON: Multiple priors, most recent 02/15/2023 FINDINGS: Bibasilar atelectasis.  Otherwise,the lungs are clear.  No focal consolidation, pleural effusion or pneumothorax. Normal cardiomediastinal contour. No acute or aggressive osseous abnormality.     No acute cardiopulmonary abnormality. Electronically signed by: Lamar Trevino Date:    07/12/2024 Time:    14:55      EKG    MICROBIOLOGY    MDM

## 2024-07-14 NOTE — ASSESSMENT & PLAN NOTE
Patient with known CAD s/p stent placement, which is controlled Will continue  home medications  and monitor for S/Sx of angina/ACS. Continue to monitor on telemetry.

## 2024-07-14 NOTE — ASSESSMENT & PLAN NOTE
Patient is chronically on statin.will continue for now. Last Lipid Panel:   Lab Results   Component Value Date    CHOL 170 04/25/2024    HDL 62 04/25/2024    LDLCALC 87 04/25/2024    TRIG 116 04/25/2024    CHOLHDL 39.2 02/28/2020   Plan:  -Continue home medication  -low fat/low calorie diet

## 2024-07-15 LAB
OHS QRS DURATION: 74 MS
OHS QTC CALCULATION: 405 MS

## 2024-07-18 ENCOUNTER — DOCUMENTATION ONLY (OUTPATIENT)
Dept: SLEEP MEDICINE | Facility: CLINIC | Age: 74
End: 2024-07-18
Payer: MEDICARE

## 2024-07-18 NOTE — PROGRESS NOTES
FINDINGS   FINDING: ________________________________________________________________________________   [address] [place] [address]   [phone]   ________________________________________________________________________________   ---------NAME--------- NUMBER SEX AGE ADMIT DISC. XRAY# F/C TYPE   LEVEL CHARLENE D [id number] F 73 7/10/24 7/10/24 QB E.R.   YOB: 1950 M/R# [id number] #: [phone]    LOCATION: TRANSCRIBED: 07/10/24 21:03   CT CHEST W/CONTRAST [id number] COMPLETED:07/10/24 20:45 EN [id number]   Reason:CC:Cough, Abnormal CXR Finding, SOB   PHYSICIAN: BHAKTI ANNE   EXAM: CT CHEST W CONTRAST   CLINICAL INDICATION: Cough   TECHNIQUE: Axial and multiplanar 2-D reformations provided   FINDINGS:   Bibasilar atelectasis or scarring. Right lung subpleural pulmonary nodules   possible 5 mm for which follow-up in one year if no comparison exams available.   No pleural effusion   Subcapsular enhancement posterior right liver. 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 extremities or head); use of   iterative reconstruction technique.   ________________________________________________________________________________   Dictated By:   ANYA MARCANTEL RADIOLOGIST   Reviewed and Electronically Signed by:   _____________________________   ANYA MARCANTEL            FINDINGS   FINDING: ________________________________________________________________________________   [facility]  [address] [place] [address]   [phone]   ________________________________________________________________________________   ---------NAME--------- NUMBER SEX AGE ADMIT DISC. XRAY# F/C TYPE   LEVEL CHARLENE SHERMAN [id number] F 73 7/10/24 7/10/24 QCHESTER E.R.   YOB: 1950 M/R# [id number] #: [phone]    LOCATION: TRANSCRIBED: 07/10/24 17:56   CHEST 2 VIEWS [id number] COMPLETED:07/10/24 17:43 JJS [id number]   Reason:CC:Cough   PHYSICIAN: BHAKTI ANNE   EXAM: XR CHEST 2 VIEWS   CLINICAL HISTORY:   Cough   PRIOR:   NONE   FINDINGS:   Bibasilar linear bandlike opacity. No sizable pleural effusion or   consolidation or convincing pneumothorax. Mediastinal contour normal.   IMPRESSION:   Bibasilar atelectasis or scarring   ________________________________________________________________________________   Dictated By:   ANYA BYERS RADIOLOGIST   Reviewed and Electronically Signed by:   _____________________________

## 2024-07-31 ENCOUNTER — PATIENT MESSAGE (OUTPATIENT)
Dept: RESEARCH | Facility: HOSPITAL | Age: 74
End: 2024-07-31
Payer: MEDICARE

## 2024-08-09 ENCOUNTER — OFFICE VISIT (OUTPATIENT)
Dept: PULMONOLOGY | Facility: CLINIC | Age: 74
End: 2024-08-09
Payer: MEDICARE

## 2024-08-09 VITALS
SYSTOLIC BLOOD PRESSURE: 128 MMHG | HEART RATE: 60 BPM | WEIGHT: 150.13 LBS | BODY MASS INDEX: 29.48 KG/M2 | RESPIRATION RATE: 15 BRPM | OXYGEN SATURATION: 97 % | HEIGHT: 60 IN | DIASTOLIC BLOOD PRESSURE: 76 MMHG

## 2024-08-09 DIAGNOSIS — R91.8 PULMONARY NODULES: ICD-10-CM

## 2024-08-09 DIAGNOSIS — R06.02 SOB (SHORTNESS OF BREATH): ICD-10-CM

## 2024-08-09 DIAGNOSIS — E78.49 OTHER HYPERLIPIDEMIA: Chronic | ICD-10-CM

## 2024-08-09 DIAGNOSIS — I10 PRIMARY HYPERTENSION: Chronic | ICD-10-CM

## 2024-08-09 DIAGNOSIS — F51.01 PRIMARY INSOMNIA: ICD-10-CM

## 2024-08-09 DIAGNOSIS — G47.00 INSOMNIA, UNSPECIFIED TYPE: ICD-10-CM

## 2024-08-09 DIAGNOSIS — J44.89 ASTHMATIC BRONCHITIS , CHRONIC: Primary | ICD-10-CM

## 2024-08-09 PROCEDURE — 99999 PR PBB SHADOW E&M-EST. PATIENT-LVL V: CPT | Mod: PBBFAC,,, | Performed by: INTERNAL MEDICINE

## 2024-08-09 RX ORDER — CYCLOBENZAPRINE HCL 10 MG
10 TABLET ORAL 3 TIMES DAILY PRN
COMMUNITY
Start: 2023-12-26

## 2024-08-09 RX ORDER — SUVOREXANT 15 MG/1
1 TABLET, FILM COATED ORAL NIGHTLY
COMMUNITY

## 2024-08-09 RX ORDER — BUDESONIDE AND FORMOTEROL FUMARATE DIHYDRATE 80; 4.5 UG/1; UG/1
2 AEROSOL RESPIRATORY (INHALATION) 2 TIMES DAILY
Qty: 10.2 G | Refills: 11 | Status: SHIPPED | OUTPATIENT
Start: 2024-08-09 | End: 2025-08-09

## 2024-08-09 RX ORDER — POLYETHYLENE GLYCOL 3350 17 G/17G
17 POWDER, FOR SOLUTION ORAL
COMMUNITY
Start: 2024-04-25

## 2024-08-09 RX ORDER — ALBUTEROL SULFATE 90 UG/1
2 INHALANT RESPIRATORY (INHALATION) EVERY 6 HOURS PRN
Qty: 18 G | Refills: 3 | Status: SHIPPED | OUTPATIENT
Start: 2024-08-09

## 2024-08-14 ENCOUNTER — PATIENT MESSAGE (OUTPATIENT)
Dept: PSYCHIATRY | Facility: CLINIC | Age: 74
End: 2024-08-14
Payer: MEDICARE

## 2024-08-22 ENCOUNTER — TELEPHONE (OUTPATIENT)
Dept: NEPHROLOGY | Facility: CLINIC | Age: 74
End: 2024-08-22
Payer: MEDICARE

## 2024-08-22 DIAGNOSIS — E21.3 HYPERPARATHYROIDISM: Primary | ICD-10-CM

## 2024-11-06 ENCOUNTER — TELEPHONE (OUTPATIENT)
Dept: CARDIOLOGY | Facility: CLINIC | Age: 74
End: 2024-11-06
Payer: MEDICARE

## 2024-11-06 NOTE — TELEPHONE ENCOUNTER
I called pt. No answer. [facility] for her to return call        ----- Message from Juliette sent at 11/6/2024  3:49 PM CST -----  Contact: Patient, [phone]  Calling because she is scheduled for procedure on 11/20/2024 and needs to know when she should stop staking the Plavix. Please call her. Thanks.

## 2024-11-07 ENCOUNTER — TELEPHONE (OUTPATIENT)
Dept: CARDIOLOGY | Facility: CLINIC | Age: 74
End: 2024-11-07
Payer: MEDICARE

## 2024-11-07 NOTE — TELEPHONE ENCOUNTER
Called [facility] [phone] and went to voice mail.  LM to fax me a cardiac clearance for her to get processed and faxed back for her injection.

## 2024-11-07 NOTE — TELEPHONE ENCOUNTER
----- Message from Juan Danielbethpedro sent at 11/7/2024  2:44 PM CST -----  Contact: Dottie  Type:  Patient Requesting a call back     Who Called:Dottie  What is the call back request regarding?:Requesting a call back in regards to needing a clearance sent to her doctor at the [facility] stating it is okay for her to stop her plavix for 7 days from November 13th-20th.   Would the patient rather a call back or a response via [facility]?call  Best Call Back Number:[phone]   Additional Information: Dr. Nikita Boyer's phone number is [phone] and they need the fax  sent to Judy at fax number [phone]

## 2024-11-07 NOTE — TELEPHONE ENCOUNTER
Called and patient was asking about her pre op for her steroid injection.  I advised I have not gotten any forms and I gave her the fax number and asked to have it faxed to me.     She voiced understanding

## 2024-11-08 NOTE — TELEPHONE ENCOUNTER
Spoke with [facility] - they are sending clearance form to [phone]- att Dr Pal's staff    They sent to wrong fax number

## 2025-02-26 ENCOUNTER — TELEPHONE (OUTPATIENT)
Dept: CARDIOLOGY | Facility: CLINIC | Age: 75
End: 2025-02-26
Payer: MEDICARE

## 2025-02-26 ENCOUNTER — PATIENT MESSAGE (OUTPATIENT)
Dept: CARDIOLOGY | Facility: CLINIC | Age: 75
End: 2025-02-26
Payer: MEDICARE

## 2025-02-26 NOTE — TELEPHONE ENCOUNTER
I contacted Arabella and went to voice mail and I advised that pt was notified in November that she will need an appt to get her cardiac pre op clearance as her last visit was 4/13/2023.      ----- Message from Francesca sent at 2/26/2025  1:36 PM CST -----  [facility]  Type: Staff Message Who called: arabella - [facility] [place] back number: [phone] for the call: need clearanceAdditional information: scheduled for next week

## 2025-03-25 DIAGNOSIS — Z01.810 ENCOUNTER FOR PRE-OPERATIVE CARDIOVASCULAR CLEARANCE: Primary | ICD-10-CM

## 2025-03-25 DIAGNOSIS — I10 PRIMARY HYPERTENSION: Chronic | ICD-10-CM

## 2025-03-28 ENCOUNTER — HOSPITAL ENCOUNTER (OUTPATIENT)
Dept: CARDIOLOGY | Facility: HOSPITAL | Age: 75
Discharge: HOME | End: 2025-03-28
Payer: MEDICARE

## 2025-03-28 ENCOUNTER — OFFICE VISIT (OUTPATIENT)
Dept: CARDIOLOGY | Facility: CLINIC | Age: 75
End: 2025-03-28
Payer: MEDICARE

## 2025-03-28 VITALS
HEIGHT: 60 IN | DIASTOLIC BLOOD PRESSURE: 80 MMHG | BODY MASS INDEX: 32.98 KG/M2 | WEIGHT: 168 LBS | SYSTOLIC BLOOD PRESSURE: 102 MMHG | HEART RATE: 75 BPM

## 2025-03-28 DIAGNOSIS — I25.10 ATHEROSCLEROSIS OF NATIVE CORONARY ARTERY OF NATIVE HEART WITHOUT ANGINA PECTORIS: ICD-10-CM

## 2025-03-28 DIAGNOSIS — Z98.61 S/P PTCA (PERCUTANEOUS TRANSLUMINAL CORONARY ANGIOPLASTY): ICD-10-CM

## 2025-03-28 DIAGNOSIS — R05.9 COUGH: ICD-10-CM

## 2025-03-28 DIAGNOSIS — I10 PRIMARY HYPERTENSION: Chronic | ICD-10-CM

## 2025-03-28 DIAGNOSIS — E78.49 OTHER HYPERLIPIDEMIA: Chronic | ICD-10-CM

## 2025-03-28 DIAGNOSIS — I25.2 OLD MI (MYOCARDIAL INFARCTION): ICD-10-CM

## 2025-03-28 DIAGNOSIS — J20.9 ACUTE BRONCHITIS, UNSPECIFIED ORGANISM: ICD-10-CM

## 2025-03-28 DIAGNOSIS — R53.83 FATIGUE, UNSPECIFIED TYPE: ICD-10-CM

## 2025-03-28 DIAGNOSIS — R91.8 PULMONARY NODULES: ICD-10-CM

## 2025-03-28 DIAGNOSIS — I25.118 CORONARY ARTERY DISEASE OF NATIVE ARTERY OF NATIVE HEART WITH STABLE ANGINA PECTORIS: Chronic | ICD-10-CM

## 2025-03-28 DIAGNOSIS — Z01.810 ENCOUNTER FOR PRE-OPERATIVE CARDIOVASCULAR CLEARANCE: ICD-10-CM

## 2025-03-28 DIAGNOSIS — R06.09 DYSPNEA ON EXERTION: ICD-10-CM

## 2025-03-28 DIAGNOSIS — Z01.818 PRE-OP EVALUATION: Primary | ICD-10-CM

## 2025-03-28 DIAGNOSIS — G47.00 INSOMNIA, UNSPECIFIED TYPE: ICD-10-CM

## 2025-03-28 LAB
OHS QRS DURATION: 68 MS
OHS QTC CALCULATION: 402 MS

## 2025-03-28 PROCEDURE — 93005 ELECTROCARDIOGRAM TRACING: CPT

## 2025-03-28 PROCEDURE — 93010 ELECTROCARDIOGRAM REPORT: CPT | Mod: ,,, | Performed by: INTERNAL MEDICINE

## 2025-03-28 PROCEDURE — 99999 PR PBB SHADOW E&M-EST. PATIENT-LVL III: CPT | Mod: PBBFAC,,, | Performed by: PHYSICIAN ASSISTANT

## 2025-03-28 RX ORDER — CEPHALEXIN 500 MG/1
500 CAPSULE ORAL 2 TIMES DAILY
Start: 2025-03-28

## 2025-03-28 RX ORDER — BENZONATATE 200 MG/1
200 CAPSULE ORAL 2 TIMES DAILY PRN
Qty: 45 CAPSULE | Refills: 0 | Status: SHIPPED | OUTPATIENT
Start: 2025-03-28

## 2025-03-28 RX ORDER — ALBUTEROL SULFATE 90 UG/1
2 INHALANT RESPIRATORY (INHALATION) EVERY 6 HOURS PRN
Qty: 8 G | Refills: 0 | Status: SHIPPED | OUTPATIENT
Start: 2025-03-28

## 2025-03-28 RX ORDER — OXYCODONE AND ACETAMINOPHEN 7.5; 325 MG/1; MG/1
1 TABLET ORAL EVERY 6 HOURS
COMMUNITY
Start: 2025-03-18

## 2025-03-28 NOTE — PROGRESS NOTES
Subjective   Patient ID:  Dottie Ramos is a 74 y.o. female who presents for follow-up of CAD, pre-op evaluation    HPI  Ms. Ramos is a 74 year old female patient whose current medical conditions include CAD s/p old MI and LAD/diagonal, hyperlipidemia, HTN, and SHEPPARD who presents today for follow-up and pre-op evaluation. Patient is scheduled to undergo epidural spinal injection at [facility] on 4/9/2025. Last seen in clinic by Dr. Pal 4/12/2023. She returns today and states she is doing well overall. No CV complaints. Denies any nolan CP, heaviness, or tightness. Endorses chronic SHEPPARD/wheezing, improved with inhalers but she has been out of this for a while now. No palpitations, LH, dizziness, near syncope, or syncope. No BLE edema, PND, orthopnea. BP stable and controlled. Patient reports compliance with her medications. States she could climb a flight of stairs.    EKG today shows SR, anteroseptal infarct, no acute changes. MPI stress test 5/4/2023 negative. TTE 5/4/2023 with normal EF.    Last labs reviewed. Lipid panel 4/24 LDL 87. Advised she needs to f/u with endocrine given hyperparathyroidism.     Review of Systems   Constitutional: Negative for chills, decreased appetite, fever and malaise/fatigue.   HENT:  Negative for congestion, hoarse voice and sore throat.    Eyes:  Negative for blurred vision and discharge.   Cardiovascular:  Positive for dyspnea on exertion. Negative for chest pain, claudication, cyanosis, irregular heartbeat, leg swelling, near-syncope, orthopnea, palpitations and paroxysmal nocturnal dyspnea.   Respiratory:  Positive for cough, shortness of breath and wheezing. Negative for hemoptysis, snoring and sputum production.    Endocrine: Negative for cold intolerance and heat intolerance.   Hematologic/Lymphatic: Negative for bleeding problem. Does not bruise/bleed easily.   Skin:  Negative for rash.   Musculoskeletal:  Positive for arthritis and joint pain. Negative for back pain,  joint swelling, muscle cramps, muscle weakness and myalgias.   Gastrointestinal:  Negative for abdominal pain, constipation, diarrhea, heartburn, melena and nausea.   Genitourinary:  Negative for hematuria.   Neurological:  Negative for dizziness, focal weakness, headaches, light-headedness, loss of balance, numbness, paresthesias, seizures and weakness.   Psychiatric/Behavioral:  Negative for memory loss. The patient does not have insomnia.    Allergic/Immunologic: Negative for hives.          Objective     Physical Exam  Vitals and nursing note reviewed.   Constitutional:       General: She is not in acute distress.     Appearance: Normal appearance. She is well-developed. She is not diaphoretic.   HENT:      Head: Normocephalic and atraumatic.   Eyes:      General:         Right eye: No discharge.         Left eye: No discharge.      Pupils: Pupils are equal, round, and reactive to light.   Neck:      Thyroid: No thyromegaly.      Vascular: No JVD.      Trachea: No tracheal deviation.   Cardiovascular:      Rate and Rhythm: Normal rate and regular rhythm.      Heart sounds: S1 normal and S2 normal. No murmur heard.  Pulmonary:      Effort: Pulmonary effort is normal. No respiratory distress.      Breath sounds: Rhonchi present. No wheezing.   Abdominal:      General: There is no distension.   Musculoskeletal:      Cervical back: Neck supple.      Right lower leg: No edema.      Left lower leg: No edema.   Skin:     General: Skin is warm and dry.      Findings: No erythema.   Neurological:      Mental Status: She is alert and oriented to person, place, and time.   Psychiatric:         Mood and Affect: Mood normal.         Behavior: Behavior normal.         Thought Content: Thought content normal.       TTE Results 5/4/2023  Summary  Show Result ComparisonThe left ventricle is normal in size with concentric remodeling and normal systolic function.  The estimated ejection fraction is 60%.  Normal left ventricular  diastolic function.  Normal right ventricular size with normal right ventricular systolic function.  Normal central venous pressure (3 mmHg).     MPI stress test Results 5/4/2023  Interpretation Summary  Show Result Comparison     Normal myocardial perfusion scan. There is no evidence of myocardial ischemia or infarction.    The gated perfusion images showed an ejection fraction of 73% at rest. The gated perfusion images showed an ejection fraction of 79% post stress.    There is normal wall motion at rest and post stress.    LV cavity size is normal at rest and normal at stress.    The ECG portion of the study is abnormal but not diagnostic.    The patient reported no chest pain during the stress test.       Assessment and Plan     1. Pre-op evaluation    2. Coronary artery disease of native artery of native heart with stable angina pectoris    3. Other hyperlipidemia    4. Primary hypertension    5. Old MI (myocardial infarction)    6. Atherosclerosis of native coronary artery of native heart without angina pectoris    7. Dyspnea on exertion    8. Pulmonary nodules    9. Fatigue, unspecified type    10. S/P PTCA (percutaneous transluminal coronary angioplasty)    11. Cough    12. Insomnia, unspecified type    13. Acute bronchitis, unspecified organism      Patient presents for f/u. Doing clinically well CV wise. No angina. Chronic SHEPPARD relieved with inhalers, one time refill given. BP controlled. Recent stress test and echo WNL. No cardiac contraindications to proceed. Moderate risk of alok and post OP CV complications. May hold Plavix 5-7 days pre procedure, resume post op ASAP.  Plan:  -Continue same CV meds/mgmt  -No cardiac contraindications to proceed with injection; moderate risk of alok and post OP CV events; may hold Plavix 5-7 days prior to procedure, resume post-op ASAP  -Cardiac low salt diet  -Needs to f/u with pulm and endocrine  -RTC 4 months with lipid, cmp with Dr. Pal

## 2025-03-31 ENCOUNTER — TELEPHONE (OUTPATIENT)
Dept: CARDIOLOGY | Facility: HOSPITAL | Age: 75
End: 2025-03-31
Payer: MEDICARE

## 2025-06-06 ENCOUNTER — TELEPHONE (OUTPATIENT)
Dept: CARDIOLOGY | Facility: CLINIC | Age: 75
End: 2025-06-06
Payer: MEDICARE

## 2025-06-13 ENCOUNTER — TELEPHONE (OUTPATIENT)
Dept: CARDIOLOGY | Facility: CLINIC | Age: 75
End: 2025-06-13
Payer: MEDICARE

## 2025-06-13 NOTE — TELEPHONE ENCOUNTER
Copied from CRM #[id number]. Topic: General Inquiry - Patient Advice  >> Jun 13, 2025  4:46 PM Shannan wrote:  Type:  clearance     Who Called: Dr Keshav Valenzuela   Symptoms (please be specific): clearance ([id number])  Would the patient rather a call back or a response via [facility]? Call back   Best Call Back Number: [phone]  Additional Information: update clearance form upcoming surgery

## 2025-06-13 NOTE — TELEPHONE ENCOUNTER
Copied from CRM #[id number]. Topic: General Inquiry - Patient Advice  >> Jun 13, 2025  1:49 PM Jeanne wrote:  Type:  Patient Requesting Call Back    Who Called:GUY AT 'S OFFICE  Does the patient know what this is regarding?:PATIENT WILL BE HAVING SURGERY ON 6/17/25 AND NURSE HAS QUESTIONS ABOUT CLEARANCE.  Would the patient rather a call back or a response via [facility]? PLEASE  CALL BACK  Best Call Back Number:[phone]  Additional Information:

## 2025-06-16 ENCOUNTER — TELEPHONE (OUTPATIENT)
Dept: CARDIOLOGY | Facility: CLINIC | Age: 75
End: 2025-06-16
Payer: MEDICARE

## 2025-06-16 NOTE — TELEPHONE ENCOUNTER
Called - no answer  LM that all instructions were sent to Dr Mack          Copied from CRM #[id number]. Topic: General Inquiry - Patient Advice  >> Jun 16, 2025  3:39 PM Francesca wrote:  Patient is requesting a call back regarding surgery tomorrow - still waiting on clearance to stop taking plavix. Please call back at [phone]

## 2025-06-16 NOTE — TELEPHONE ENCOUNTER
Knee arthroscopy - being done tomorrow 6/17 - got back letter with the approval with see attached note- but no attached note and the clearance is for a different procedure-     Just need something saying yes or no if OK to have surgery for above 6/6message with OK on holding blood thinners does not suffice  -- can resend that but needs to say OK to have knee arthroscopy Sx    Fax number below - please send ASAP - Sx tomorrow          Copied from CRM #[id number]. Topic: General Inquiry - Patient Advice  >> Jun 16, 2025 12:15 PM Belkys wrote:  ..Type:  Needs Medical Advice    Who Called: Mimi     Would the patient rather a call back or a response via [facility]? Call back   Best Call Back Number: [phone]  Additional Information: Mimi with Dr. Keshav Mack  office on the back line asking for an return call in regards to calling several time on last week to get an clearance for pt up coming procedure. Needing clearance to states whether pt could have procedure or not . Fax number : [phone]

## 2025-08-13 ENCOUNTER — OFFICE VISIT (OUTPATIENT)
Dept: CARDIOLOGY | Facility: CLINIC | Age: 75
End: 2025-08-13
Payer: MEDICARE

## 2025-08-13 ENCOUNTER — LAB VISIT (OUTPATIENT)
Dept: LAB | Facility: HOSPITAL | Age: 75
End: 2025-08-13
Attending: PHYSICIAN ASSISTANT
Payer: MEDICARE

## 2025-08-13 VITALS
HEIGHT: 61 IN | WEIGHT: 166.25 LBS | HEART RATE: 84 BPM | DIASTOLIC BLOOD PRESSURE: 82 MMHG | BODY MASS INDEX: 31.39 KG/M2 | SYSTOLIC BLOOD PRESSURE: 130 MMHG

## 2025-08-13 DIAGNOSIS — Z98.61 S/P PTCA (PERCUTANEOUS TRANSLUMINAL CORONARY ANGIOPLASTY): ICD-10-CM

## 2025-08-13 DIAGNOSIS — I25.10 CORONARY ARTERY DISEASE, UNSPECIFIED VESSEL OR LESION TYPE, UNSPECIFIED WHETHER ANGINA PRESENT, UNSPECIFIED WHETHER NATIVE OR TRANSPLANTED HEART: ICD-10-CM

## 2025-08-13 DIAGNOSIS — I10 PRIMARY HYPERTENSION: Chronic | ICD-10-CM

## 2025-08-13 DIAGNOSIS — E78.49 OTHER HYPERLIPIDEMIA: Chronic | ICD-10-CM

## 2025-08-13 DIAGNOSIS — I25.118 CORONARY ARTERY DISEASE OF NATIVE ARTERY OF NATIVE HEART WITH STABLE ANGINA PECTORIS: Chronic | ICD-10-CM

## 2025-08-13 DIAGNOSIS — I25.2 OLD MI (MYOCARDIAL INFARCTION): ICD-10-CM

## 2025-08-13 DIAGNOSIS — I25.10 ATHEROSCLEROSIS OF NATIVE CORONARY ARTERY OF NATIVE HEART WITHOUT ANGINA PECTORIS: ICD-10-CM

## 2025-08-13 DIAGNOSIS — R94.31 ABNORMAL EKG: ICD-10-CM

## 2025-08-13 DIAGNOSIS — E78.5 HYPERLIPIDEMIA, UNSPECIFIED HYPERLIPIDEMIA TYPE: ICD-10-CM

## 2025-08-13 DIAGNOSIS — R06.02 SHORTNESS OF BREATH: ICD-10-CM

## 2025-08-13 DIAGNOSIS — R06.02 SHORTNESS OF BREATH: Primary | ICD-10-CM

## 2025-08-13 LAB
ALBUMIN SERPL BCP-MCNC: 4.1 G/DL (ref 3.5–5.2)
ALP SERPL-CCNC: 70 UNIT/L (ref 40–150)
ALT SERPL W/O P-5'-P-CCNC: 32 UNIT/L (ref 0–55)
ANION GAP (OHS): 8 MMOL/L (ref 8–16)
AST SERPL-CCNC: 29 UNIT/L (ref 0–50)
BILIRUB SERPL-MCNC: 0.8 MG/DL (ref 0.1–1)
BUN SERPL-MCNC: 18 MG/DL (ref 8–23)
CALCIUM SERPL-MCNC: 10.3 MG/DL (ref 8.7–10.5)
CHLORIDE SERPL-SCNC: 105 MMOL/L (ref 95–110)
CHOLEST SERPL-MCNC: 174 MG/DL (ref 120–199)
CHOLEST/HDLC SERPL: 2.9 {RATIO} (ref 2–5)
CO2 SERPL-SCNC: 28 MMOL/L (ref 23–29)
CREAT SERPL-MCNC: 0.9 MG/DL (ref 0.5–1.4)
GFR SERPLBLD CREATININE-BSD FMLA CKD-EPI: >60 ML/MIN/1.73/M2
GLUCOSE SERPL-MCNC: 94 MG/DL (ref 70–110)
HDLC SERPL-MCNC: 60 MG/DL (ref 40–75)
HDLC SERPL: 34.5 % (ref 20–50)
LDLC SERPL CALC-MCNC: 96.6 MG/DL (ref 63–159)
NONHDLC SERPL-MCNC: 114 MG/DL
POTASSIUM SERPL-SCNC: 4.3 MMOL/L (ref 3.5–5.1)
PROT SERPL-MCNC: 6.7 GM/DL (ref 6–8.4)
SODIUM SERPL-SCNC: 141 MMOL/L (ref 136–145)
TRIGL SERPL-MCNC: 87 MG/DL (ref 30–150)

## 2025-08-13 PROCEDURE — 1160F RVW MEDS BY RX/DR IN RCRD: CPT | Mod: CPTII,S$GLB,, | Performed by: INTERNAL MEDICINE

## 2025-08-13 PROCEDURE — 99999 PR PBB SHADOW E&M-EST. PATIENT-LVL IV: CPT | Mod: PBBFAC,,, | Performed by: INTERNAL MEDICINE

## 2025-08-13 PROCEDURE — 82465 ASSAY BLD/SERUM CHOLESTEROL: CPT

## 2025-08-13 PROCEDURE — 80053 COMPREHEN METABOLIC PANEL: CPT

## 2025-08-13 PROCEDURE — 1100F PTFALLS ASSESS-DOCD GE2>/YR: CPT | Mod: CPTII,S$GLB,, | Performed by: INTERNAL MEDICINE

## 2025-08-13 PROCEDURE — 1159F MED LIST DOCD IN RCRD: CPT | Mod: CPTII,S$GLB,, | Performed by: INTERNAL MEDICINE

## 2025-08-13 PROCEDURE — 36415 COLL VENOUS BLD VENIPUNCTURE: CPT

## 2025-08-13 PROCEDURE — 99214 OFFICE O/P EST MOD 30 MIN: CPT | Mod: S$GLB,,, | Performed by: INTERNAL MEDICINE

## 2025-08-13 PROCEDURE — 1126F AMNT PAIN NOTED NONE PRSNT: CPT | Mod: CPTII,S$GLB,, | Performed by: INTERNAL MEDICINE

## 2025-08-13 PROCEDURE — 3288F FALL RISK ASSESSMENT DOCD: CPT | Mod: CPTII,S$GLB,, | Performed by: INTERNAL MEDICINE

## 2025-08-13 PROCEDURE — 3079F DIAST BP 80-89 MM HG: CPT | Mod: CPTII,S$GLB,, | Performed by: INTERNAL MEDICINE

## 2025-08-13 PROCEDURE — 3075F SYST BP GE 130 - 139MM HG: CPT | Mod: CPTII,S$GLB,, | Performed by: INTERNAL MEDICINE

## 2025-08-13 RX ORDER — ATORVASTATIN CALCIUM 80 MG/1
80 TABLET, FILM COATED ORAL DAILY
Qty: 30 TABLET | Refills: 11 | Status: SHIPPED | OUTPATIENT
Start: 2025-08-13 | End: 2026-08-13

## 2025-08-13 RX ORDER — AMLODIPINE BESYLATE 5 MG/1
5 TABLET ORAL NIGHTLY
Qty: 90 TABLET | Refills: 3 | Status: SHIPPED | OUTPATIENT
Start: 2025-08-13

## 2025-08-13 RX ORDER — ROPINIROLE 0.25 MG/1
0.25 TABLET, FILM COATED ORAL DAILY PRN
COMMUNITY
Start: 2025-07-24

## 2025-08-15 ENCOUNTER — RESULTS FOLLOW-UP (OUTPATIENT)
Dept: CARDIOLOGY | Facility: CLINIC | Age: 75
End: 2025-08-15
Payer: MEDICARE

## 2025-08-27 ENCOUNTER — HOSPITAL ENCOUNTER (OUTPATIENT)
Dept: RADIOLOGY | Facility: HOSPITAL | Age: 75
Discharge: HOME | End: 2025-08-27
Attending: INTERNAL MEDICINE
Payer: MEDICARE

## 2025-08-27 ENCOUNTER — HOSPITAL ENCOUNTER (OUTPATIENT)
Dept: CARDIOLOGY | Facility: HOSPITAL | Age: 75
Discharge: HOME | End: 2025-08-27
Attending: INTERNAL MEDICINE
Payer: MEDICARE

## 2025-08-27 DIAGNOSIS — R94.31 ABNORMAL EKG: ICD-10-CM

## 2025-08-27 DIAGNOSIS — I25.118 CORONARY ARTERY DISEASE OF NATIVE ARTERY OF NATIVE HEART WITH STABLE ANGINA PECTORIS: Chronic | ICD-10-CM

## 2025-08-27 DIAGNOSIS — R06.02 SHORTNESS OF BREATH: ICD-10-CM

## 2025-08-27 PROCEDURE — 63600175 PHARM REV CODE 636 W HCPCS: Performed by: INTERNAL MEDICINE

## 2025-08-27 PROCEDURE — 93017 CV STRESS TEST TRACING ONLY: CPT

## 2025-08-27 PROCEDURE — 78452 HT MUSCLE IMAGE SPECT MULT: CPT

## 2025-08-27 PROCEDURE — A9502 TC99M TETROFOSMIN: HCPCS | Performed by: INTERNAL MEDICINE

## 2025-08-27 RX ORDER — REGADENOSON 0.08 MG/ML
0.4 INJECTION, SOLUTION INTRAVENOUS
Status: COMPLETED | OUTPATIENT
Start: 2025-08-27 | End: 2025-08-27

## 2025-08-27 RX ADMIN — TETROFOSMIN 10 MILLICURIE: 1.38 INJECTION, POWDER, LYOPHILIZED, FOR SOLUTION INTRAVENOUS at 08:08

## 2025-08-27 RX ADMIN — REGADENOSON 0.4 MG: 0.08 INJECTION, SOLUTION INTRAVENOUS at 09:08

## 2025-08-27 RX ADMIN — TETROFOSMIN 31.5 MILLICURIE: 1.38 INJECTION, POWDER, LYOPHILIZED, FOR SOLUTION INTRAVENOUS at 09:08

## 2025-08-28 ENCOUNTER — TELEPHONE (OUTPATIENT)
Dept: CARDIOLOGY | Facility: CLINIC | Age: 75
End: 2025-08-28
Payer: MEDICARE

## 2025-08-28 LAB
CV STRESS BASE HR: 64 BPM
DIASTOLIC BLOOD PRESSURE: 99 MMHG
NUC REST EJECTION FRACTION: 81
NUC STRESS EJECTION FRACTION: 70 %
OHS CV CPX 85 PERCENT MAX PREDICTED HEART RATE MALE: 122
OHS CV CPX MAX PREDICTED HEART RATE: 144
OHS CV CPX PATIENT IS FEMALE: 1
OHS CV CPX PATIENT IS MALE: 0
OHS CV CPX PEAK DIASTOLIC BLOOD PRESSURE: 77 MMHG
OHS CV CPX PEAK HEAR RATE: 100 BPM
OHS CV CPX PEAK RATE PRESSURE PRODUCT: NORMAL
OHS CV CPX PEAK SYSTOLIC BLOOD PRESSURE: 146 MMHG
OHS CV CPX PERCENT MAX PREDICTED HEART RATE ACHIEVED: 72
OHS CV CPX RATE PRESSURE PRODUCT PRESENTING: 8384
OHS CV INITIAL DOSE: 10 MCG/KG/MIN
OHS CV PEAK DOSE: 31.5 MCG/KG/MIN
SYSTOLIC BLOOD PRESSURE: 131 MMHG